# Patient Record
Sex: MALE | NOT HISPANIC OR LATINO
[De-identification: names, ages, dates, MRNs, and addresses within clinical notes are randomized per-mention and may not be internally consistent; named-entity substitution may affect disease eponyms.]

---

## 2017-05-31 ENCOUNTER — OTHER (OUTPATIENT)
Age: 57
End: 2017-05-31

## 2017-05-31 ENCOUNTER — RX RENEWAL (OUTPATIENT)
Age: 57
End: 2017-05-31

## 2017-05-31 PROBLEM — Z00.00 ENCOUNTER FOR PREVENTIVE HEALTH EXAMINATION: Status: ACTIVE | Noted: 2017-05-31

## 2017-06-01 ENCOUNTER — RX RENEWAL (OUTPATIENT)
Age: 57
End: 2017-06-01

## 2017-08-01 ENCOUNTER — APPOINTMENT (OUTPATIENT)
Dept: ENDOCRINOLOGY | Facility: CLINIC | Age: 57
End: 2017-08-01

## 2017-09-26 ENCOUNTER — APPOINTMENT (OUTPATIENT)
Dept: ENDOCRINOLOGY | Facility: CLINIC | Age: 57
End: 2017-09-26
Payer: COMMERCIAL

## 2017-09-26 VITALS
WEIGHT: 194 LBS | HEART RATE: 75 BPM | BODY MASS INDEX: 26.86 KG/M2 | HEIGHT: 71.26 IN | DIASTOLIC BLOOD PRESSURE: 90 MMHG | SYSTOLIC BLOOD PRESSURE: 150 MMHG

## 2017-09-26 DIAGNOSIS — Z87.891 PERSONAL HISTORY OF NICOTINE DEPENDENCE: ICD-10-CM

## 2017-09-26 DIAGNOSIS — Z78.9 OTHER SPECIFIED HEALTH STATUS: ICD-10-CM

## 2017-09-26 DIAGNOSIS — I82.409 ACUTE EMBOLISM AND THROMBOSIS OF UNSPECIFIED DEEP VEINS OF UNSPECIFIED LOWER EXTREMITY: ICD-10-CM

## 2017-09-26 DIAGNOSIS — I51.7 CARDIOMEGALY: ICD-10-CM

## 2017-09-26 DIAGNOSIS — I26.99 OTHER PULMONARY EMBOLISM W/OUT ACUTE COR PULMONALE: ICD-10-CM

## 2017-09-26 PROCEDURE — 99214 OFFICE O/P EST MOD 30 MIN: CPT

## 2017-10-04 ENCOUNTER — RESULT CHARGE (OUTPATIENT)
Age: 57
End: 2017-10-04

## 2017-10-27 ENCOUNTER — APPOINTMENT (OUTPATIENT)
Dept: ENDOCRINOLOGY | Facility: CLINIC | Age: 57
End: 2017-10-27

## 2017-11-09 ENCOUNTER — APPOINTMENT (OUTPATIENT)
Dept: ENDOCRINOLOGY | Facility: CLINIC | Age: 57
End: 2017-11-09

## 2017-11-13 ENCOUNTER — OTHER (OUTPATIENT)
Age: 57
End: 2017-11-13

## 2017-11-20 ENCOUNTER — RX RENEWAL (OUTPATIENT)
Age: 57
End: 2017-11-20

## 2018-02-16 ENCOUNTER — OTHER (OUTPATIENT)
Age: 58
End: 2018-02-16

## 2018-05-03 ENCOUNTER — RX RENEWAL (OUTPATIENT)
Age: 58
End: 2018-05-03

## 2018-05-11 ENCOUNTER — APPOINTMENT (OUTPATIENT)
Dept: ENDOCRINOLOGY | Facility: CLINIC | Age: 58
End: 2018-05-11

## 2018-07-05 ENCOUNTER — RX RENEWAL (OUTPATIENT)
Age: 58
End: 2018-07-05

## 2018-10-22 ENCOUNTER — APPOINTMENT (OUTPATIENT)
Dept: ENDOCRINOLOGY | Facility: CLINIC | Age: 58
End: 2018-10-22

## 2018-10-24 ENCOUNTER — RX RENEWAL (OUTPATIENT)
Age: 58
End: 2018-10-24

## 2018-11-23 ENCOUNTER — RX RENEWAL (OUTPATIENT)
Age: 58
End: 2018-11-23

## 2018-12-07 ENCOUNTER — RX RENEWAL (OUTPATIENT)
Age: 58
End: 2018-12-07

## 2018-12-07 RX ORDER — QUINAPRIL HYDROCHLORIDE 40 MG/1
40 TABLET, FILM COATED ORAL DAILY
Qty: 90 | Refills: 3 | Status: ACTIVE | COMMUNITY
Start: 2017-09-26 | End: 1900-01-01

## 2019-01-14 ENCOUNTER — RX RENEWAL (OUTPATIENT)
Age: 59
End: 2019-01-14

## 2019-01-15 ENCOUNTER — APPOINTMENT (OUTPATIENT)
Dept: ENDOCRINOLOGY | Facility: CLINIC | Age: 59
End: 2019-01-15
Payer: COMMERCIAL

## 2019-01-15 VITALS
SYSTOLIC BLOOD PRESSURE: 134 MMHG | HEART RATE: 71 BPM | DIASTOLIC BLOOD PRESSURE: 78 MMHG | BODY MASS INDEX: 28.56 KG/M2 | HEIGHT: 71 IN | WEIGHT: 204 LBS

## 2019-01-15 DIAGNOSIS — I10 ESSENTIAL (PRIMARY) HYPERTENSION: ICD-10-CM

## 2019-01-15 PROCEDURE — 82962 GLUCOSE BLOOD TEST: CPT

## 2019-01-15 PROCEDURE — 99215 OFFICE O/P EST HI 40 MIN: CPT | Mod: 25

## 2019-01-29 ENCOUNTER — OTHER (OUTPATIENT)
Age: 59
End: 2019-01-29

## 2019-01-29 DIAGNOSIS — E55.9 VITAMIN D DEFICIENCY, UNSPECIFIED: ICD-10-CM

## 2019-01-29 LAB
25(OH)D3 SERPL-MCNC: 9.5 NG/ML
ALBUMIN SERPL ELPH-MCNC: 4.1 G/DL
ALP BLD-CCNC: 61 U/L
ALT SERPL-CCNC: 11 U/L
ANION GAP SERPL CALC-SCNC: 10 MMOL/L
AST SERPL-CCNC: 10 U/L
BILIRUB SERPL-MCNC: 0.4 MG/DL
BUN SERPL-MCNC: 15 MG/DL
C PEPTIDE SERPL-MCNC: 1.1 NG/ML
CALCIUM SERPL-MCNC: 9.5 MG/DL
CHLORIDE SERPL-SCNC: 101 MMOL/L
CHOLEST SERPL-MCNC: 172 MG/DL
CHOLEST/HDLC SERPL: 3.2 RATIO
CO2 SERPL-SCNC: 28 MMOL/L
CREAT SERPL-MCNC: 1.33 MG/DL
CREAT SPEC-SCNC: 141 MG/DL
GLUCOSE BLDC GLUCOMTR-MCNC: 240
GLUCOSE SERPL-MCNC: 206 MG/DL
HBA1C MFR BLD HPLC: 10.7 %
HDLC SERPL-MCNC: 53 MG/DL
LDLC SERPL CALC-MCNC: 88 MG/DL
MICROALBUMIN 24H UR DL<=1MG/L-MCNC: 19.9 MG/DL
MICROALBUMIN/CREAT 24H UR-RTO: 142 MG/G
POTASSIUM SERPL-SCNC: 4.1 MMOL/L
PROT SERPL-MCNC: 7.2 G/DL
SODIUM SERPL-SCNC: 140 MMOL/L
TRIGL SERPL-MCNC: 154 MG/DL
TSH SERPL-ACNC: 1.15 UIU/ML

## 2019-01-29 RX ORDER — LOSARTAN POTASSIUM 50 MG/1
50 TABLET, FILM COATED ORAL
Qty: 90 | Refills: 3 | Status: ACTIVE | COMMUNITY
Start: 2019-01-29 | End: 1900-01-01

## 2019-02-20 ENCOUNTER — RX RENEWAL (OUTPATIENT)
Age: 59
End: 2019-02-20

## 2019-02-25 ENCOUNTER — APPOINTMENT (OUTPATIENT)
Dept: ENDOCRINOLOGY | Facility: CLINIC | Age: 59
End: 2019-02-25
Payer: COMMERCIAL

## 2019-02-25 ENCOUNTER — TRANSCRIPTION ENCOUNTER (OUTPATIENT)
Age: 59
End: 2019-02-25

## 2019-02-25 VITALS
HEART RATE: 65 BPM | SYSTOLIC BLOOD PRESSURE: 132 MMHG | DIASTOLIC BLOOD PRESSURE: 65 MMHG | HEIGHT: 71 IN | WEIGHT: 208 LBS | BODY MASS INDEX: 29.12 KG/M2

## 2019-02-25 PROCEDURE — 82962 GLUCOSE BLOOD TEST: CPT

## 2019-02-25 PROCEDURE — 99215 OFFICE O/P EST HI 40 MIN: CPT | Mod: 25

## 2019-02-26 NOTE — END OF VISIT
[FreeTextEntry3] : All medical record entries made by the Scribe were at my, Dr. Fady Negron, direction and personally dictated by me on 02/25/2019. I have reviewed the chart and agree that the record accurately reflects my personal performance of the history, physical exam, assessment and plan. I have also personally directed, reviewed and agreed with the chart. \par \par   [>50% of Time Spent on Counseling for ____] : Greater than 50% of the encounter time was spent on counseling for [unfilled] [Time Spent: ___ minutes] : I have spent [unfilled] minutes of face to face time with the patient

## 2019-02-26 NOTE — HISTORY OF PRESENT ILLNESS
[FreeTextEntry1] : 7/2016: A1c 10.8%, high TG, s.creat 1.13, alb/creat ratio 305.4, TSH 0.63 \par 1/15/19: A1c 10.7%, BMI 28.45, LDL-88, , s.creat 1.33, Vitamin D 25 OH 9.5, microalb/creat ratio 142, C-peptide 1.1, TSH 1.15\par \par 57 y/o M, /65, BMI 29.01, former pt from [facility], with Hx of T2DM (dx in 1996) with DM related complications, proteinuria and retinopathy. Other significant PMHx includes HTN, HLD, PE (June 2016; hospitalized and d/c with LVH, reduced L ventricular function), DVT and LVH. Pt see's a cardiologist at [place] on a regular basis. Pt does not have a meal schedule, nor does he follow a diabetes diet. Last podiatrist visit: last year. Last Fundoscopic: x2 years ago. Pt presents today for DM f/u. \par \par Today, pt states he is feeling well with no complaints. BS at home as high as 190 and as low as 52. Pt states sometimes he checks his blood sugars in the morning and at other times in the afternoon when he feels symptomatic. Pt notes he still drinks EtOH, but not with any juices. Denies shortness of breath, chest pain, weight changes, nocturia, pins and needle sensation in lower extremities, leg pains, ulcers, GARCIA. \par \par Medicines: Glimepiride 4 mg,  Metformin 1 gm bid, Losartan 50 mg, Basaglar 30 units daily, Januvia, Vitamin D, Xarelto \par

## 2019-02-26 NOTE — ADDENDUM
[FreeTextEntry1] : I, Cirilo Back, acted soley as a scribe for Dr. Fady Negron on this date. 02/25/2019.

## 2019-02-26 NOTE — ASSESSMENT
[Carbohydrate Consistent Diet] : carbohydrate consistent diet [Diabetes Foot Care] : diabetes foot care [Self Monitoring of Blood Glucose] : self monitoring of blood glucose [FreeTextEntry1] : 1. T2DM poorly controlled with multiple DM related complications. Hx of PE and LVH. Appointment with cardiology next month. BP and cholesterol within normal limit. C/o hypoglycemias approximately 20% of the time. Decrease basal from 30 to 25. It will continue to decrease, as long as pt continues with lifestyle and diet improvement. \par \par Appointment to see nutritionist and ophthalmologist. After long conversation, pt agreed to see nutritionist. Continue with ARB. Emphasized importance of documenting post prandial finger stick values with a target glucose level <180. Pt will send me records in 2 weeks. \par \par \par Return in 2 months.  [Long Term Vascular Complications] : long term vascular complications of diabetes

## 2019-02-28 ENCOUNTER — TRANSCRIPTION ENCOUNTER (OUTPATIENT)
Age: 59
End: 2019-02-28

## 2019-02-28 LAB — GLUCOSE BLDC GLUCOMTR-MCNC: 129

## 2019-03-19 ENCOUNTER — APPOINTMENT (OUTPATIENT)
Dept: ENDOCRINOLOGY | Facility: CLINIC | Age: 59
End: 2019-03-19
Payer: COMMERCIAL

## 2019-03-19 VITALS
DIASTOLIC BLOOD PRESSURE: 84 MMHG | SYSTOLIC BLOOD PRESSURE: 152 MMHG | HEART RATE: 60 BPM | HEIGHT: 71 IN | WEIGHT: 208 LBS | BODY MASS INDEX: 29.12 KG/M2

## 2019-03-19 PROCEDURE — 99214 OFFICE O/P EST MOD 30 MIN: CPT | Mod: 25

## 2019-03-19 PROCEDURE — 82962 GLUCOSE BLOOD TEST: CPT

## 2019-03-21 NOTE — ADDENDUM
[FreeTextEntry1] : I, Cirilo Back, acted soley as a scribe for Dr. Fady Negron on this date. 03/19/2019.

## 2019-03-21 NOTE — ASSESSMENT
[Carbohydrate Consistent Diet] : carbohydrate consistent diet [Diabetes Foot Care] : diabetes foot care [Long Term Vascular Complications] : long term vascular complications of diabetes [Self Monitoring of Blood Glucose] : self monitoring of blood glucose [FreeTextEntry1] : 1. T2DM poorly controlled with multiple DM related complications. Hx of PE and LVH. Appointment with cardiology yesterday, BP medications are being adjusted. Pt has turned his care around. Pt is mindful with lifestyle and medical treatment. Continue present drug therapy management. Awaiting for cardiovascular evaluation. Lab tests today. Return in 2 months. \par

## 2019-03-21 NOTE — END OF VISIT
[FreeTextEntry3] : All medical record entries made by the Scribe were at my, Dr. Fady Negron, direction and personally dictated by me on 03/19/2019. I have reviewed the chart and agree that the record accurately reflects my personal performance of the history, physical exam, assessment and plan. I have also personally directed, reviewed and agreed with the chart. \par \par

## 2019-03-21 NOTE — PHYSICAL EXAM
[Alert] : alert [No Acute Distress] : no acute distress [Well Nourished] : well nourished [Well Developed] : well developed [Normal Sclera/Conjunctiva] : normal sclera/conjunctiva [EOMI] : extra ocular movement intact [No Proptosis] : no proptosis [Normal Oropharynx] : the oropharynx was normal [Thyroid Not Enlarged] : the thyroid was not enlarged [No Thyroid Nodules] : there were no palpable thyroid nodules [No Respiratory Distress] : no respiratory distress [No Accessory Muscle Use] : no accessory muscle use [Clear to Auscultation] : lungs were clear to auscultation bilaterally [Normal Rate] : heart rate was normal  [Regular Rhythm] : with a regular rhythm [Normal S1, S2] : normal S1 and S2 [No Edema] : there was no peripheral edema [Pedal Pulses Normal] : the pedal pulses are present [Normal Bowel Sounds] : normal bowel sounds [Soft] : abdomen soft [Not Tender] : non-tender [Post Cervical Nodes] : posterior cervical nodes [Not Distended] : not distended [Axillary Nodes] : axillary nodes [Anterior Cervical Nodes] : anterior cervical nodes [Normal] : normal and non tender [No Spinal Tenderness] : no spinal tenderness [Spine Straight] : spine straight [No Stigmata of Cushings Syndrome] : no stigmata of cushings syndrome [Normal Gait] : normal gait [Normal Strength/Tone] : muscle strength and tone were normal [No Rash] : no rash [Normal Reflexes] : deep tendon reflexes were 2+ and symmetric [No Tremors] : no tremors [Oriented x3] : oriented to person, place, and time [Acanthosis Nigricans] : no acanthosis nigricans

## 2019-03-21 NOTE — HISTORY OF PRESENT ILLNESS
[FreeTextEntry1] : 7/2016: A1c 10.8%, high TG, s.creat 1.13, alb/creat ratio 305.4, TSH 0.63 \par 1/15/19: A1c 10.7%, BMI 28.45, LDL-88, , s.creat 1.33, Vitamin D 25 OH 9.5, microalb/creat ratio 142, C-peptide 1.1, TSH 1.15\par \par 59 y/o M, /84, BMI 29.01, former pt from [facility], with Hx of T2DM (dx in 1996) with DM related complications, proteinuria and retinopathy. Other significant PMHx includes HTN, HLD, PE (June 2016; hospitalized and d/c with LVH, reduced L ventricular function), DVT and LVH. Pt see's a cardiologist at [place] on a regular basis. Pt does not have a meal schedule, nor does he follow a diabetes diet. Last podiatrist visit: last year. Last Fundoscopic: x2 years ago. Pt presents today for DM f/u. \par \par Today, pt states he is feeling well with no complaints. Pt states he saw his cardiologist yesterday, who is adjusting pt's BP meds. Pt reports better sugar levels at home with only 1 encounter of sugar levels > 200. Pt states he changed his portion sizes for his meals. Pt is not drinking as much EtOH as he used to drink. Denies shortness of breath, chest pain, weight changes, nocturia, pins and needle sensation in lower extremities, leg pains, ulcers, GARCIA. \par \par Medicines: Glimepiride 4 mg,  Metformin 1 gm bid, Losartan 50 mg, Basaglar 30 units daily, Januvia, Vitamin D, Xarelto \par

## 2019-03-26 ENCOUNTER — OTHER (OUTPATIENT)
Age: 59
End: 2019-03-26

## 2019-03-28 ENCOUNTER — RX RENEWAL (OUTPATIENT)
Age: 59
End: 2019-03-28

## 2019-03-30 LAB
ALBUMIN SERPL ELPH-MCNC: 4.2 G/DL
ALP BLD-CCNC: 48 U/L
ALT SERPL-CCNC: 15 U/L
ANION GAP SERPL CALC-SCNC: 14 MMOL/L
AST SERPL-CCNC: 13 U/L
BILIRUB SERPL-MCNC: 0.4 MG/DL
BUN SERPL-MCNC: 20 MG/DL
CALCIUM SERPL-MCNC: 9.9 MG/DL
CHLORIDE SERPL-SCNC: 103 MMOL/L
CHOLEST SERPL-MCNC: 189 MG/DL
CHOLEST/HDLC SERPL: 3.4 RATIO
CO2 SERPL-SCNC: 25 MMOL/L
CREAT SERPL-MCNC: 1.31 MG/DL
CREAT SPEC-SCNC: 156 MG/DL
GLUCOSE BLDC GLUCOMTR-MCNC: 107
GLUCOSE SERPL-MCNC: 118 MG/DL
HBA1C MFR BLD HPLC: 9.2 %
HDLC SERPL-MCNC: 55 MG/DL
LDLC SERPL CALC-MCNC: 106 MG/DL
MICROALBUMIN 24H UR DL<=1MG/L-MCNC: 19.3 MG/DL
MICROALBUMIN/CREAT 24H UR-RTO: 124 MG/G
POTASSIUM SERPL-SCNC: 4.3 MMOL/L
PROT SERPL-MCNC: 7.2 G/DL
SODIUM SERPL-SCNC: 142 MMOL/L
TRIGL SERPL-MCNC: 142 MG/DL
TSH SERPL-ACNC: 1.47 UIU/ML

## 2019-04-01 ENCOUNTER — RX RENEWAL (OUTPATIENT)
Age: 59
End: 2019-04-01

## 2019-05-21 ENCOUNTER — APPOINTMENT (OUTPATIENT)
Dept: ENDOCRINOLOGY | Facility: CLINIC | Age: 59
End: 2019-05-21
Payer: COMMERCIAL

## 2019-05-21 VITALS
WEIGHT: 208 LBS | BODY MASS INDEX: 29.12 KG/M2 | HEIGHT: 71 IN | SYSTOLIC BLOOD PRESSURE: 136 MMHG | HEART RATE: 63 BPM | DIASTOLIC BLOOD PRESSURE: 83 MMHG

## 2019-05-21 DIAGNOSIS — E66.3 OVERWEIGHT: ICD-10-CM

## 2019-05-21 LAB — GLUCOSE BLDC GLUCOMTR-MCNC: 133

## 2019-05-21 PROCEDURE — 82962 GLUCOSE BLOOD TEST: CPT

## 2019-05-21 PROCEDURE — 99215 OFFICE O/P EST HI 40 MIN: CPT | Mod: 25

## 2019-05-24 PROBLEM — E66.3 OVERWEIGHT: Status: ACTIVE | Noted: 2019-05-24

## 2019-05-24 NOTE — END OF VISIT
[FreeTextEntry3] : All medical record entries made by the Scribe were at my, Dr. Fady Negron, direction and personally dictated by me on 05/21/2019. I have reviewed the chart and agree that the record accurately reflects my personal performance of the history, physical exam, assessment and plan. I have also personally directed, reviewed and agreed with the chart.\par  [Time Spent: ___ minutes] : I have spent [unfilled] minutes of face to face time with the patient [>50% of Time Spent on Counseling for ____] : Greater than 50% of the encounter time was spent on counseling for [unfilled]

## 2019-05-24 NOTE — PHYSICAL EXAM
[Alert] : alert [No Acute Distress] : no acute distress [Normal Sclera/Conjunctiva] : normal sclera/conjunctiva [No Proptosis] : no proptosis [Normal Oropharynx] : the oropharynx was normal [Thyroid Not Enlarged] : the thyroid was not enlarged [No Thyroid Nodules] : there were no palpable thyroid nodules [No Respiratory Distress] : no respiratory distress [No Accessory Muscle Use] : no accessory muscle use [Clear to Auscultation] : lungs were clear to auscultation bilaterally [Normal Rate] : heart rate was normal  [Pedal Pulses Normal] : the pedal pulses are present [No Edema] : there was no peripheral edema [Normal Bowel Sounds] : normal bowel sounds [Spine Straight] : spine straight [No Stigmata of Cushings Syndrome] : no stigmata of cushings syndrome [Normal Gait] : normal gait [Normal Strength/Tone] : muscle strength and tone were normal [No Rash] : no rash [Normal Reflexes] : deep tendon reflexes were 2+ and symmetric [No Tremors] : no tremors [Oriented x3] : oriented to person, place, and time [Normal Outer Ear/Nose] : the ears and nose were normal in appearance [Right Foot Was Examined] : right foot ~C was examined [Left Foot Was Examined] : left foot ~C was examined [2+] : 2+ in the dorsalis pedis [Acanthosis Nigricans] : no acanthosis nigricans [FreeTextEntry1] : calluses [FreeTextEntry5] : calluses [Swelling] : not swollen

## 2019-05-24 NOTE — ASSESSMENT
[Carbohydrate Consistent Diet] : carbohydrate consistent diet [Diabetes Foot Care] : diabetes foot care [Long Term Vascular Complications] : long term vascular complications of diabetes [Self Monitoring of Blood Glucose] : self monitoring of blood glucose [FreeTextEntry1] : 59 y/o M pt with:\par 1. Hx of T2D with multiple complications:\par Pt is focused and proactive on his DM regimen, overall his glycemic has improved. Recommend pt continue with basal insulin and OAD. \par \par 2. Hx of Hyperlipidemia:\par Pt is at increased risk of developing cardiac events, and sees a a cardiologist at regular basis at [place].\par \par 3. Hx of overweight:\par Pt is physically active and is currently working on his food portions. Set goal to lose 10lbs within next 2-3 months.\par \par Return in 4 months. [Importance of Diet and Exercise] : importance of diet and exercise to improve glycemic control, achieve weight loss and improve cardiovascular health [Action and use of Insulin] : action and use of short and long-acting insulin

## 2019-05-24 NOTE — REVIEW OF SYSTEMS
[Negative] : Endocrine [As Noted in HPI] : as noted in HPI [de-identified] : denies pins and needle sensation in lower extremities

## 2019-05-24 NOTE — HISTORY OF PRESENT ILLNESS
[FreeTextEntry1] : 7/2016: A1c 10.8%, high TG, s.creat 1.13, alb/creat ratio 305.4, TSH 0.63 \par 1/15/19: A1c 10.7%, BMI 28.45, LDL-88, , s.creat 1.33, Vitamin D 25 OH 9.5, microalb/creat ratio 142, C-peptide 1.1, TSH 1.15\par 3/19/19: A1c 9.2%, TSH 1.47, s. creat 1.31, microalb/creat ratio 124 \par \par 57 y/o M, /83, BMI 29.01, former pt from [facility], with Hx of T2DM (dx in 1996) with DM related complications, proteinuria and retinopathy. \par Other significant PMHx includes HTN, HLD, PE (June 2016; hospitalized and d/c with LVH, reduced L ventricular function), DVT and LVH. \par Follows with a cardiologist Dr. Brenda Zuluaga ([phone]) at [place] on a regular basis. \par Does not have a meal schedule, nor does he follow a diabetes diet. \par Last podiatrist visit: last year. \par Last Fundoscopic: x2 years ago. \par \par 3/19/19's visit:\par Today, pt states he is feeling well with no complaints. Pt states he saw his cardiologist yesterday, who is adjusting pt's BP meds. Pt reports better sugar levels at home with only 1 encounter of sugar levels > 200. Pt states he changed his portion sizes for his meals. Pt is not drinking as much EtOH as he used to drink. Denies shortness of breath, chest pain, weight changes, nocturia, pins and needle sensation in lower extremities, leg pains, ulcers, GARCIA. \par \par 5/21/19's visit:\par Today pt presents for DM f/u, feeling better. He notes his main concerns are about his heart. He saw his cardiologist on April 16, and states he will be scheduled for an echo. Pt notes his heart medication was increased to 150 and his blood pressure medication dosage was also changed. He reports blood sugar readings around 183 and 176. \par Pt notes when he drinks he goes to the bathroom 3-4x in the night, and c/o swelling in his knee, which he relates to his DVT.\par He denies pins and needle sensation in lower extremities.\par \par Medicines: Glimepiride 4 mg,  Metformin 1 gm bid, Losartan 50 mg, Basaglar 20 units daily, Januvia 100mg, Vitamin D, Xarelto

## 2019-05-24 NOTE — ADDENDUM
[FreeTextEntry1] : I, Keithgayathri Rowell, acted solely as a scribe for Dr. Fady Negron on this date. 05/21/2019.\par

## 2019-05-27 ENCOUNTER — RX RENEWAL (OUTPATIENT)
Age: 59
End: 2019-05-27

## 2019-06-03 LAB
ALBUMIN SERPL ELPH-MCNC: 4.1 G/DL
ALP BLD-CCNC: 59 U/L
ALT SERPL-CCNC: 15 U/L
ANION GAP SERPL CALC-SCNC: 12 MMOL/L
AST SERPL-CCNC: 10 U/L
BILIRUB SERPL-MCNC: 0.5 MG/DL
BUN SERPL-MCNC: 16 MG/DL
CALCIUM SERPL-MCNC: 9.9 MG/DL
CHLORIDE SERPL-SCNC: 102 MMOL/L
CHOLEST SERPL-MCNC: 168 MG/DL
CHOLEST/HDLC SERPL: 4 RATIO
CO2 SERPL-SCNC: 26 MMOL/L
CREAT SERPL-MCNC: 1.32 MG/DL
CREAT SPEC-SCNC: 159 MG/DL
ESTIMATED AVERAGE GLUCOSE: 212 MG/DL
GLUCOSE SERPL-MCNC: 136 MG/DL
HBA1C MFR BLD HPLC: 9 %
HDLC SERPL-MCNC: 42 MG/DL
LDLC SERPL CALC-MCNC: 63 MG/DL
MICROALBUMIN 24H UR DL<=1MG/L-MCNC: 11.8 MG/DL
MICROALBUMIN/CREAT 24H UR-RTO: 74 MG/G
POTASSIUM SERPL-SCNC: 4.5 MMOL/L
PROT SERPL-MCNC: 7.2 G/DL
SODIUM SERPL-SCNC: 140 MMOL/L
TRIGL SERPL-MCNC: 317 MG/DL
TSH SERPL-ACNC: 1.09 UIU/ML

## 2019-07-01 ENCOUNTER — APPOINTMENT (OUTPATIENT)
Dept: ENDOCRINOLOGY | Facility: CLINIC | Age: 59
End: 2019-07-01

## 2019-07-02 ENCOUNTER — APPOINTMENT (OUTPATIENT)
Dept: ENDOCRINOLOGY | Facility: CLINIC | Age: 59
End: 2019-07-02
Payer: COMMERCIAL

## 2019-07-02 ENCOUNTER — APPOINTMENT (OUTPATIENT)
Dept: ENDOCRINOLOGY | Facility: CLINIC | Age: 59
End: 2019-07-02

## 2019-07-02 VITALS
SYSTOLIC BLOOD PRESSURE: 151 MMHG | DIASTOLIC BLOOD PRESSURE: 85 MMHG | HEIGHT: 71 IN | BODY MASS INDEX: 29.12 KG/M2 | WEIGHT: 208 LBS | HEART RATE: 87 BPM

## 2019-07-02 LAB — GLUCOSE BLDC GLUCOMTR-MCNC: 123

## 2019-07-02 PROCEDURE — 82962 GLUCOSE BLOOD TEST: CPT

## 2019-07-02 PROCEDURE — 99214 OFFICE O/P EST MOD 30 MIN: CPT | Mod: 25

## 2019-09-12 ENCOUNTER — RX RENEWAL (OUTPATIENT)
Age: 59
End: 2019-09-12

## 2019-10-01 ENCOUNTER — APPOINTMENT (OUTPATIENT)
Dept: ENDOCRINOLOGY | Facility: CLINIC | Age: 59
End: 2019-10-01

## 2019-10-30 ENCOUNTER — RX RENEWAL (OUTPATIENT)
Age: 59
End: 2019-10-30

## 2019-11-26 ENCOUNTER — APPOINTMENT (OUTPATIENT)
Dept: ENDOCRINOLOGY | Facility: CLINIC | Age: 59
End: 2019-11-26
Payer: COMMERCIAL

## 2019-11-26 VITALS
DIASTOLIC BLOOD PRESSURE: 74 MMHG | SYSTOLIC BLOOD PRESSURE: 150 MMHG | HEART RATE: 70 BPM | WEIGHT: 204 LBS | BODY MASS INDEX: 28.45 KG/M2

## 2019-11-26 DIAGNOSIS — E16.2 HYPOGLYCEMIA, UNSPECIFIED: ICD-10-CM

## 2019-11-26 PROCEDURE — 99214 OFFICE O/P EST MOD 30 MIN: CPT | Mod: 25

## 2019-11-26 PROCEDURE — 82962 GLUCOSE BLOOD TEST: CPT

## 2019-11-26 RX ORDER — BLOOD-GLUCOSE METER
W/DEVICE KIT MISCELLANEOUS
Qty: 1 | Refills: 0 | Status: ACTIVE | COMMUNITY
Start: 2019-11-26 | End: 1900-01-01

## 2019-11-27 PROBLEM — E16.2 HYPOGLYCEMIA: Status: ACTIVE | Noted: 2019-11-27

## 2019-11-27 NOTE — PHYSICAL EXAM
[Alert] : alert [Normal Sclera/Conjunctiva] : normal sclera/conjunctiva [Thyroid Not Enlarged] : the thyroid was not enlarged [No Proptosis] : no proptosis [Normal Outer Ear/Nose] : the ears and nose were normal in appearance [No Respiratory Distress] : no respiratory distress [No Thyroid Nodules] : there were no palpable thyroid nodules [No Accessory Muscle Use] : no accessory muscle use [Clear to Auscultation] : lungs were clear to auscultation bilaterally [Pedal Pulses Normal] : the pedal pulses are present [Normal Rate] : heart rate was normal  [Spine Straight] : spine straight [Normal Bowel Sounds] : normal bowel sounds [No Edema] : there was no peripheral edema [Normal Gait] : normal gait [No Stigmata of Cushings Syndrome] : no stigmata of cushings syndrome [No Rash] : no rash [Normal Strength/Tone] : muscle strength and tone were normal [Left Foot Was Examined] : left foot ~C was examined [Right Foot Was Examined] : right foot ~C was examined [Normal Reflexes] : deep tendon reflexes were 2+ and symmetric [No Tremors] : no tremors [Oriented x3] : oriented to person, place, and time [Acanthosis Nigricans] : no acanthosis nigricans [Swelling] : not swollen [FreeTextEntry1] : calluses [FreeTextEntry5] : calluses

## 2019-11-27 NOTE — END OF VISIT
[FreeTextEntry3] : All medical record entries made by the Scribe were at my, Dr. Fady Negron, direction and personally dictated by me on 11/26/2019. I have reviewed the chart and agree that the record accurately reflects my personal performance of the history, physical exam, assessment and plan. I have also personally directed, reviewed and agreed with the chart. [>50% of Time Spent on Counseling for ____] : Greater than 50% of the encounter time was spent on counseling for [unfilled] [Time Spent: ___ minutes] : I have spent [unfilled] minutes of face to face time with the patient

## 2019-11-27 NOTE — HISTORY OF PRESENT ILLNESS
[FreeTextEntry1] : 7/2016: A1c 10.8%, high TG, s.creat 1.13, alb/creat ratio 305.4, TSH 0.63 \par 6/17/19: A1c 8.5%, total cholesterol 214, , LDL-c 114, \par \par 60 y/o M, /74, BMI 28.45, former pt from [facility], with Hx of T2DM (dx in 1996) with DM related complications, proteinuria and retinopathy. \par Other significant PMHx includes HTN, HLD, PE (June 2016; hospitalized and d/c with LVH, reduced L ventricular function), DVT and LVH. Follows with a cardiologist Dr. Brenda Zuluaga ([phone]) at [place] on a regular basis. \par Last cardiologist visit: 10/2019\par Last PCP visit: currently looking for a PCP\par \par 3/19/19's visit:\par Today, pt states he is feeling well with no complaints. Pt states he saw his cardiologist yesterday, who is adjusting pt's BP meds. Pt reports better sugar levels at home with only 1 encounter of sugar levels > 200. Pt states he changed his portion sizes for his meals. Pt is not drinking as much EtOH as he used to drink. Denies shortness of breath, chest pain, weight changes, nocturia, pins and needle sensation in lower extremities, leg pains, ulcers, GARCIA. \par \par 5/21/19's visit:\par Today pt presents for DM f/u, feeling better. He notes his main concerns are about his heart. He saw his cardiologist on April 16, and states he will be scheduled for an echo. Pt notes his heart medication was increased to 150 and his blood pressure medication dosage was also changed. He reports blood sugar readings around 183 and 176. \par Pt notes when he drinks he goes to the bathroom 3-4x in the night, and c/o swelling in his knee, which he relates to his DVT.\par He denies pins and needle sensation in lower extremities.\par \par 7/2/19's visit:\par Today pt presents for DM f/u, feeling well with no major physical complaints besides developing a styex6 days.\par Pt states he was taken off metoprolol, and is now using carvedilol 25mg BID and losartan 100mg QD.\par Denies pins and needle sensation in lower extremities, SOB, CP, and nocturia.\par \par 11/26/19\par Today pt presents for DM f/u; pt had an episodes of severe hypoglycemia with BS of 32 at 6:30 AM today. He reports he did not eat dinner last night. Pt states on his way to work today he passed out for ~1 hour and 30 minutes 2/2 low BS and he was sent to the hospital at 7AM. Pt states he was given orange juice and apple juice along with an IV at the hospital. He notes he ate oatmeal around 11 AM, and his POCT is currently 225. \par Pt has not monitored BS since 10/2019.\par Pt has weight loss ~ 4lbs since last visit (02/07/19)\par \par Current Medication: Atorvastatin 20mg QD (started 07/02/19) , Glimepiride 4 mg QD,  Metformin 1 gm BID, Losartan 100 mg QD, Basaglar 30 u QD, Januvia 100 mg, Vitamin D, Carvedilol 25 mg BID, Xarelto

## 2019-11-27 NOTE — REVIEW OF SYSTEMS
[Recent Weight Loss (___ Lbs)] : recent [unfilled] ~Ulb weight loss [Negative] : Integumentary [As Noted in HPI] : as noted in HPI [Blurry Vision] : no blurred vision [Chest Pain] : no chest pain [Shortness Of Breath] : no shortness of breath [Nocturia] : no nocturia [de-identified] : denies pins and needle sensation in lower extremities  [de-identified] : episodes of hypoglycemia

## 2019-11-27 NOTE — ADDENDUM
[FreeTextEntry1] : I, Zuleyma Sanchez, acted solely as a scribe for Dr. Fady Negron on this date. 11/26/2019.\par I, Gretchen Rowell, acted solely as a scribe for Dr. Fady Negron on this date. 11/26/2019.

## 2019-11-27 NOTE — ASSESSMENT
[Carbohydrate Consistent Diet] : carbohydrate consistent diet [Long Term Vascular Complications] : long term vascular complications of diabetes [Hypoglycemia Management] : hypoglycemia management [Importance of Diet and Exercise] : importance of diet and exercise to improve glycemic control, achieve weight loss and improve cardiovascular health [Self Monitoring of Blood Glucose] : self monitoring of blood glucose [FreeTextEntry1] : 58 y/o M pt with:\par \par 1. Hx of longstanding T2DM with multiple complications including CAD:\par Pt is working on lifestyle and diet management, and he is working on reaching pre and post prandial glucose targets. Pt experienced severe hypoglycemia episode today after prolonged fasting (>12 hrs) and lost his consciousness. Discussed hypoglycemia prevention and treatments with pt. \par Recommend pt decrease Basaglar  to 18 u, decrease Glimepiride from 4 mg to 2 mg, continue with Januvia and continue with Metformin. \par Referred to neurologist for hypoglycemia evaluation. \par Labs ordered today\par \par Return in 2 months.

## 2019-12-02 ENCOUNTER — OTHER (OUTPATIENT)
Age: 59
End: 2019-12-02

## 2019-12-02 LAB
ALBUMIN SERPL ELPH-MCNC: 4.3 G/DL
ALP BLD-CCNC: 45 U/L
ALT SERPL-CCNC: 19 U/L
ANION GAP SERPL CALC-SCNC: 14 MMOL/L
AST SERPL-CCNC: 22 U/L
BILIRUB SERPL-MCNC: 0.7 MG/DL
BUN SERPL-MCNC: 17 MG/DL
CALCIUM SERPL-MCNC: 9.5 MG/DL
CHLORIDE SERPL-SCNC: 101 MMOL/L
CHOLEST SERPL-MCNC: 186 MG/DL
CHOLEST/HDLC SERPL: 3 RATIO
CO2 SERPL-SCNC: 23 MMOL/L
CREAT SERPL-MCNC: 1.69 MG/DL
CREAT SPEC-SCNC: 149 MG/DL
ESTIMATED AVERAGE GLUCOSE: 160 MG/DL
GLUCOSE BLDC GLUCOMTR-MCNC: 225
GLUCOSE SERPL-MCNC: 175 MG/DL
HBA1C MFR BLD HPLC: 7.2 %
HDLC SERPL-MCNC: 62 MG/DL
LDLC SERPL CALC-MCNC: 97 MG/DL
MICROALBUMIN 24H UR DL<=1MG/L-MCNC: 6.8 MG/DL
MICROALBUMIN/CREAT 24H UR-RTO: 46 MG/G
POTASSIUM SERPL-SCNC: 4.4 MMOL/L
PROT SERPL-MCNC: 7.2 G/DL
SODIUM SERPL-SCNC: 138 MMOL/L
TRIGL SERPL-MCNC: 134 MG/DL

## 2019-12-04 ENCOUNTER — OTHER (OUTPATIENT)
Age: 59
End: 2019-12-04

## 2019-12-16 ENCOUNTER — RX RENEWAL (OUTPATIENT)
Age: 59
End: 2019-12-16

## 2019-12-16 ENCOUNTER — TRANSCRIPTION ENCOUNTER (OUTPATIENT)
Age: 59
End: 2019-12-16

## 2019-12-17 ENCOUNTER — APPOINTMENT (OUTPATIENT)
Dept: ENDOCRINOLOGY | Facility: CLINIC | Age: 59
End: 2019-12-17
Payer: COMMERCIAL

## 2019-12-17 VITALS
SYSTOLIC BLOOD PRESSURE: 134 MMHG | WEIGHT: 209 LBS | HEART RATE: 75 BPM | BODY MASS INDEX: 29.15 KG/M2 | DIASTOLIC BLOOD PRESSURE: 79 MMHG

## 2019-12-17 PROCEDURE — 99214 OFFICE O/P EST MOD 30 MIN: CPT | Mod: 25

## 2019-12-17 PROCEDURE — 82962 GLUCOSE BLOOD TEST: CPT

## 2019-12-20 NOTE — ASSESSMENT
[Hypoglycemia Management] : hypoglycemia management [Carbohydrate Consistent Diet] : carbohydrate consistent diet [Importance of Diet and Exercise] : importance of diet and exercise to improve glycemic control, achieve weight loss and improve cardiovascular health [Long Term Vascular Complications] : long term vascular complications of diabetes [Self Monitoring of Blood Glucose] : self monitoring of blood glucose [FreeTextEntry1] : 58 y/o M pt with:\par \par 1. Hx of poorly controlled T2DM with multiple complications including CAD:\par Pt's management of DM has improved over the past couple of months. Pt is focused and has been making effort towards treatment goals. However, his diet is uncontrolled and continues to be high in calories. Pt recently presented with severe hypoglycemic episodes after prolonged fasting and was referred to neurologist. To improve his DM outcome, pt will have focus on his lifestyle and diet management. Pt is seeing cardiologist in 2 weeks. Referred pt to RD to improve DM outcome. \par \par Return in 5/2020\par \par \par

## 2019-12-20 NOTE — REVIEW OF SYSTEMS
[Negative] : Psychiatric [Recent Weight Gain (___ Lbs)] : recent [unfilled] ~Ulb weight gain [As Noted in HPI] : as noted in HPI [Chest Pain] : no chest pain [Shortness Of Breath] : no shortness of breath [Nocturia] : no nocturia [de-identified] : denies pins and needle sensation in lower extremities

## 2019-12-20 NOTE — END OF VISIT
[FreeTextEntry3] : All medical record entries made by the Scribe were at my, Dr. Fady Negron, direction and personally dictated by me on 12/17/2019. I have reviewed the chart and agree that the record accurately reflects my personal performance of the history, physical exam, assessment and plan. I have also personally directed, reviewed and agreed with the chart.  [Time Spent: ___ minutes] : I have spent [unfilled] minutes of face to face time with the patient [>50% of Time Spent on Counseling for ____] : Greater than 50% of the encounter time was spent on counseling for [unfilled]

## 2019-12-20 NOTE — HISTORY OF PRESENT ILLNESS
[FreeTextEntry1] : 7/2016: A1c 10.8%, high TG, s.creat 1.13, alb/creat ratio 305.4, TSH 0.63 \par 6/17/19: A1c 8.5%, total cholesterol 214, , LDL-c 114, \par 11/26/19: A1c 7.2%, Mean Plasma Glucose 160, s.creat 1.69, Microalb/Creat Ratio 46, LDL-c 97\par \par 58 y/o M, /79, BMI 29.15, former pt from [facility], with Hx of T2DM (dx in 1996) with DM related complications, proteinuria and retinopathy. \par Other significant PMHx includes HTN, HLD, PE (June 2016; hospitalized and d/c with LVH, reduced L ventricular function), DVT and LVH. Follows with a cardiologist Dr. Brenda Zuluaga ([phone]) at [place] on a regular basis. \par Last ophthalmologist visit: several years ago\par Last cardiologist visit: scheduled for end of 12/2019\par Last PCP visit: currently looking for a PCP\par Pt has not seen RD.\par \par 3/19/19's visit:\par Today, pt states he is feeling well with no complaints. Pt states he saw his cardiologist yesterday, who is adjusting pt's BP meds. Pt reports better sugar levels at home with only 1 encounter of sugar levels > 200. Pt states he changed his portion sizes for his meals. Pt is not drinking as much EtOH as he used to drink. Denies shortness of breath, chest pain, weight changes, nocturia, pins and needle sensation in lower extremities, leg pains, ulcers, GARCIA. \par \par 5/21/19's visit:\par Today pt presents for DM f/u, feeling better. He notes his main concerns are about his heart. He saw his cardiologist on April 16, and states he will be scheduled for an echo. Pt notes his heart medication was increased to 150 and his blood pressure medication dosage was also changed. He reports blood sugar readings around 183 and 176. \par Pt notes when he drinks he goes to the bathroom 3-4x in the night, and c/o swelling in his knee, which he relates to his DVT.\par He denies pins and needle sensation in lower extremities.\par \par 7/2/19's visit:\par Today pt presents for DM f/u, feeling well with no major physical complaints besides developing a styex6 days.\par Pt states he was taken off metoprolol, and is now using carvedilol 25mg BID and losartan 100mg QD.\par Denies pins and needle sensation in lower extremities, SOB, CP, and nocturia.\par \par 11/26/19\par Today pt presents for DM f/u; pt had an episodes of severe hypoglycemia with BS of 32 at 6:30 AM today. He reports he did not eat dinner last night. Pt states on his way to work today he passed out for ~1 hour and 30 minutes 2/2 low BS and he was sent to the hospital at 7AM. Pt states he was given orange juice and apple juice along with an IV at the hospital. He notes he ate oatmeal around 11 AM, and his POCT is currently 225. \par Pt has not monitored BS since 10/2019.\par Pt has weight loss ~ 4lbs since last visit (02/07/19)\par \par 12/17/2019 \par Today pt with POCT 130 presents for DM f/u, feeling good.  Pt felt like he had "low BS" this morning and drank orange juice and he had a big breakfast.\par Pt notes that he monitors BS 2-3 times a week and his FBS ranges from 90 to 110, and occasional 160. Pt reports that he has not seen neurologist for hypoglycemic evaluation as recommended in his last visit. He also states that he has not visited RD. Pt has gained 5lbs since his last visit. \par Denies pins and needles sensation in lower extremities. \par \par Current Medication: Atorvastatin 20mg QD (started 07/02/19) , Glimepiride 2mg QD,  Metformin 1 gm BID, Losartan 100 mg QD, Basaglar 20u QD, Januvia 100 mg, Vitamin D, Carvedilol 25 mg BID, Xarelto

## 2019-12-20 NOTE — PHYSICAL EXAM
[Alert] : alert [Normal Sclera/Conjunctiva] : normal sclera/conjunctiva [Normal Outer Ear/Nose] : the ears and nose were normal in appearance [No Proptosis] : no proptosis [No Thyroid Nodules] : there were no palpable thyroid nodules [Thyroid Not Enlarged] : the thyroid was not enlarged [No Respiratory Distress] : no respiratory distress [No Accessory Muscle Use] : no accessory muscle use [Clear to Auscultation] : lungs were clear to auscultation bilaterally [Normal Rate] : heart rate was normal  [Pedal Pulses Normal] : the pedal pulses are present [No Edema] : there was no peripheral edema [Normal Bowel Sounds] : normal bowel sounds [Spine Straight] : spine straight [Normal Gait] : normal gait [No Stigmata of Cushings Syndrome] : no stigmata of cushings syndrome [No Rash] : no rash [Normal Strength/Tone] : muscle strength and tone were normal [Left Foot Was Examined] : left foot ~C was examined [Right Foot Was Examined] : right foot ~C was examined [Normal Reflexes] : deep tendon reflexes were 2+ and symmetric [No Tremors] : no tremors [Oriented x3] : oriented to person, place, and time [Acanthosis Nigricans] : no acanthosis nigricans [Swelling] : not swollen [FreeTextEntry5] : calluses [FreeTextEntry1] : calluses

## 2019-12-20 NOTE — ADDENDUM
[FreeTextEntry1] : I, Zuleyma Sanchez, acted solely as a scribe for Dr. Fady Negron on this date. 12/17/2019. \par I, Gretchen Rowell, acted solely as a scribe for Dr. Fady Negron on this date. 12/17/2019.

## 2019-12-21 LAB — GLUCOSE BLDC GLUCOMTR-MCNC: 130

## 2020-01-25 ENCOUNTER — RX RENEWAL (OUTPATIENT)
Age: 60
End: 2020-01-25

## 2020-04-14 ENCOUNTER — APPOINTMENT (OUTPATIENT)
Dept: ENDOCRINOLOGY | Facility: CLINIC | Age: 60
End: 2020-04-14

## 2020-07-24 ENCOUNTER — APPOINTMENT (OUTPATIENT)
Dept: ENDOCRINOLOGY | Facility: CLINIC | Age: 60
End: 2020-07-24
Payer: COMMERCIAL

## 2020-07-24 PROCEDURE — 99214 OFFICE O/P EST MOD 30 MIN: CPT | Mod: 95

## 2020-07-26 NOTE — HISTORY OF PRESENT ILLNESS
[Home] : at home, [unfilled] , at the time of the visit. [Verbal consent obtained from patient] : the patient, [unfilled] [Other Location: e.g. Home (Enter Location, City,State)___] : at [unfilled] [FreeTextEntry1] : 60 y/o M, /79, BMI 29.15, former pt from [facility], with Hx of T2DM (dx in 1996) with DM related complications, proteinuria and retinopathy. \par Other significant PMHx includes HTN, HLD, PE (June 2016; hospitalized and d/c with LVH, reduced L ventricular function), DVT and LVH. Follows with a cardiologist Dr. Brenda Zuluaga ([phone]) at [place] on a regular basis. \par Last ophthalmologist visit: several years ago\par Last cardiologist visit: scheduled for end of 12/2019\par Last PCP visit: currently looking for a PCP\par \par \par 11/26/19\par Today pt presents for DM f/u; pt had an episodes of severe hypoglycemia with BS of 32 at 6:30 AM today. He reports he did not eat dinner last night. Pt states on his way to work today he passed out for ~1 hour and 30 minutes 2/2 low BS and he was sent to the hospital at 7AM. Pt states he was given orange juice and apple juice along with an IV at the hospital. He notes he ate oatmeal around 11 AM, and his POCT is currently 225. \par Pt has not monitored BS since 10/2019.\par Pt has weight loss ~ 4lbs since last visit (02/07/19)\par \par 12/17/2019 \par Today pt with POCT 130 presents for DM f/u, feeling good.  Pt felt like he had "low BS" this morning and drank orange juice and he had a big breakfast.\par Pt notes that he monitors BS 2-3 times a week and his FBS ranges from 90 to 110, and occasional 160. Pt reports that he has not seen neurologist for hypoglycemic evaluation as recommended in his last visit. He also states that he has not visited RD. Pt has gained 5lbs since his last visit. \par Denies pins and needles sensation in lower extremities. \par \par \par 7/24/20 Telehealth\par He did not have question prior to restriction to today visit\par T2d, proteinuria, CKD, hyperlipidemia,LVH.\par COVID-19 stay home, he continues focus with his treatment\par Funduscopic exam: not in a while\par Clinically doing well, no hypo's,no osmotic diuresis .\par FBS: < 140's\par 7/2016: A1c 10.8%, high TG, s.creat 1.13, alb/creat ratio 305.4, TSH 0.63 \par 11/26/19: A1c 7.2%, Mean Plasma Glucose 160, s.creat 1.69, Microalb/Creat Ratio 46, LDL-c 97\par \par Current Medication: Atorvastatin 20mg QD (started 07/02/19) , Glimepiride 2mg QD,  Metformin 1 gm BID, Losartan 100 mg QD, Basaglar 20u QD, Januvia 100 mg, Vitamin D, Carvedilol 25 mg BID, Xarelto

## 2020-08-31 ENCOUNTER — RX RENEWAL (OUTPATIENT)
Age: 60
End: 2020-08-31

## 2020-09-28 ENCOUNTER — TRANSCRIPTION ENCOUNTER (OUTPATIENT)
Age: 60
End: 2020-09-28

## 2020-09-30 ENCOUNTER — TRANSCRIPTION ENCOUNTER (OUTPATIENT)
Age: 60
End: 2020-09-30

## 2020-10-07 ENCOUNTER — TRANSCRIPTION ENCOUNTER (OUTPATIENT)
Age: 60
End: 2020-10-07

## 2020-11-16 ENCOUNTER — TRANSCRIPTION ENCOUNTER (OUTPATIENT)
Age: 60
End: 2020-11-16

## 2020-11-17 ENCOUNTER — NON-APPOINTMENT (OUTPATIENT)
Age: 60
End: 2020-11-17

## 2020-12-15 ENCOUNTER — RX RENEWAL (OUTPATIENT)
Age: 60
End: 2020-12-15

## 2021-01-04 ENCOUNTER — TRANSCRIPTION ENCOUNTER (OUTPATIENT)
Age: 61
End: 2021-01-04

## 2021-01-05 ENCOUNTER — APPOINTMENT (OUTPATIENT)
Dept: ENDOCRINOLOGY | Facility: CLINIC | Age: 61
End: 2021-01-05
Payer: COMMERCIAL

## 2021-01-05 VITALS
WEIGHT: 210 LBS | BODY MASS INDEX: 29.4 KG/M2 | DIASTOLIC BLOOD PRESSURE: 86 MMHG | SYSTOLIC BLOOD PRESSURE: 142 MMHG | HEART RATE: 65 BPM | HEIGHT: 71 IN

## 2021-01-05 DIAGNOSIS — E78.5 HYPERLIPIDEMIA, UNSPECIFIED: ICD-10-CM

## 2021-01-05 LAB — GLUCOSE BLDC GLUCOMTR-MCNC: 245

## 2021-01-05 PROCEDURE — 99215 OFFICE O/P EST HI 40 MIN: CPT | Mod: 25

## 2021-01-05 PROCEDURE — 99072 ADDL SUPL MATRL&STAF TM PHE: CPT

## 2021-01-05 PROCEDURE — 82962 GLUCOSE BLOOD TEST: CPT

## 2021-01-06 PROBLEM — E78.5 HYPERLIPIDEMIA LDL GOAL <100: Status: ACTIVE | Noted: 2020-07-26

## 2021-01-06 NOTE — PHYSICAL EXAM
[Alert] : alert [Normal Sclera/Conjunctiva] : normal sclera/conjunctiva [No Proptosis] : no proptosis [Normal Outer Ear/Nose] : the ears and nose were normal in appearance [No Neck Mass] : no neck mass was observed [Thyroid Not Enlarged] : the thyroid was not enlarged [No Thyroid Nodules] : no palpable thyroid nodules [No Respiratory Distress] : no respiratory distress [Clear to Auscultation] : lungs were clear to auscultation bilaterally [Normal Rate] : heart rate was normal [No Edema] : no peripheral edema [Pedal Pulses Normal] : the pedal pulses are present [Normal Bowel Sounds] : normal bowel sounds [Spine Straight] : spine straight [No Stigmata of Cushings Syndrome] : no stigmata of Cushings Syndrome [Normal Gait] : normal gait [Right Foot Was Examined] : right foot ~C was examined [Left Foot Was Examined] : left foot ~C was examined [No Tremors] : no tremors [Oriented x3] : oriented to person, place, and time [2+] : 2+ in the dorsalis pedis [Acanthosis Nigricans] : no acanthosis nigricans [de-identified] : B [FreeTextEntry1] : calluses [FreeTextEntry2] : dry shiny thick nails  [FreeTextEntry5] : calluses  [FreeTextEntry6] : dry shiny thick nails

## 2021-01-06 NOTE — REVIEW OF SYSTEMS
[Recent Weight Gain (___ Lbs)] : recent weight gain: [unfilled] lbs [Blurred Vision] : blurred vision [As Noted in HPI] : as noted in HPI [Negative] : Psychiatric [de-identified] : denies pins and needle sensation in lower extremities  [de-identified] : d

## 2021-01-06 NOTE — ADDENDUM
[FreeTextEntry1] : I, Gretchen Rowell, acted solely as a scribe for Dr. Fady Negron on this date. 01/05/2021.

## 2021-01-06 NOTE — HISTORY OF PRESENT ILLNESS
[FreeTextEntry1] : 61 y/o M, /86, BMI 29.29, former pt from [facility], with Hx of T2DM (dx in 1996) with DM related complications, proteinuria and retinopathy. \par Other significant PMHx includes HTN, HLD, PE (June 2016; hospitalized and d/c with LVH, reduced L ventricular function), DVT and LVH. Follows with a cardiologist Dr. Brenda Zuluaga ([phone]) at [place] on a regular basis. \par Last ophthalmologist visit: several years ago\par Last cardiologist visit: summer 2020\par \par 12/17/2019 \par Today pt with POCT 130 presents for DM f/u, feeling good.  Pt felt like he had "low BS" this morning and drank orange juice and he had a big breakfast.\par Pt notes that he monitors BS 2-3 times a week and his FBS ranges from 90 to 110, and occasional 160. Pt reports that he has not seen neurologist for hypoglycemic evaluation as recommended in his last visit. He also states that he has not visited RD. Pt has gained 5lbs since his last visit. \par Denies pins and needles sensation in lower extremities. \par \par 7/24/20 Telehealth\par He did not have question prior to restriction to today visit\par T2d, proteinuria, CKD, hyperlipidemia,LVH.\par COVID-19 stay home, he continues focus with his treatment\par Funduscopic exam: not in a while\par Clinically doing well, no hypo's,no osmotic diuresis .\par FBS: < 140's\par \par 1/5/21\par Today pt presents for DM f/u with POCT 245, feeling well with c/o blurry vision, and weight gain. Pt states he will make an appointment to see Ophthalmologist soon. \par Pt notes he has not been checking his BS readings\par Pt states he is on Lantus 20u, Metformin 1g BID, Glimepiride 4mg QD, Onglyza 5mg QD, Carvedilol 25mg BID, Aldactone/Spironolactone 25mg QD, Xarelto 20mg, and Olmesartan 40mg. \par Denies pins and needle sensation in lower extremities, and shoulder/hand pain.\par \par Current Medication: Lantus 20u, Metformin 1g BID, Glimepiride 4mg QD, Onglyza 5mg QD, Carvedilol 25mg BID, Aldactone/Spironolactone 25mg QD, Xarelto 20mg, and Olmesartan 40mg\par \par Labs:\par - 11/26/19: A1c 7.2%, Mean Plasma Glucose 160, s.creat 1.69, Microalb/Creat Ratio 46, LDL-c 97\par - 7/2016: A1c 10.8%, high TG, s.creat 1.13, alb/creat ratio 305.4, TSH 0.63

## 2021-01-06 NOTE — ASSESSMENT
[Importance of Diet and Exercise] : importance of diet and exercise to improve glycemic control, achieve weight loss and improve cardiovascular health [Hypoglycemia Management] : hypoglycemia management [Action and use of Insulin] : action and use of short and long-acting insulin [Self Monitoring of Blood Glucose] : self monitoring of blood glucose [FreeTextEntry1] : 59 y/o M pt with:\par \par 1. T2DM (dx in 1996):\par Pt's DM is poorly controlled with hx of proteinuria, retinopathy, and L Ventricular Hypertrophy. Pt sees Cardiologist at [place]. Other PMHx include DVT and PE. \par Today POCT A1c was 9.3% and POCT sugar was 245. Pt admitted that he was not focused with his DM management during the pandemic. Diabetes treatment goals discussed. \par Pt is currently on Lantus 20u QD, Metformin 1g BID, Glimepiride 4mg QD, and Onglyza 5mg QD. \par Recommend pt increase Lantus to 25u QD, initiate Humalog 8u ac meals, initiate Jardiance 10mg QD, continue Metformin 1g BID, discontinue Onglyza, and discontinue Glimepiride. \par In addition, recommend pt check BS readings before/after meals. \par Will order labs today. \par \par 2. Mixed hyperlipidemia, predominantly hypertriglyceredemia\par He is at increase risk for ASCVD,\par Rx statins\par Return in 3 months\par \par \par \par \par \par

## 2021-01-06 NOTE — END OF VISIT
[Time Spent: ___ minutes] : I have spent [unfilled] minutes of time on the encounter. [>50% of the face to face encounter time was spent on counseling and/or coordination of care for ___] : Greater than 50% of the face to face encounter time was spent on counseling and/or coordination of care for [unfilled] [FreeTextEntry3] : All medical record entries made by the Scribe were at my, Dr. Fady Negron, direction and personally dictated by me on 01/05/2021. I have reviewed the chart and agree that the record accurately reflects my personal performance of the history, physical exam, assessment and plan. I have also personally directed, reviewed and agreed with the chart.

## 2021-01-07 RX ORDER — INSULIN LISPRO 100 [IU]/ML
100 INJECTION, SOLUTION SUBCUTANEOUS 3 TIMES DAILY
Qty: 3 | Refills: 11 | Status: COMPLETED | COMMUNITY
Start: 2021-01-05 | End: 2021-01-07

## 2021-01-26 ENCOUNTER — RX CHANGE (OUTPATIENT)
Age: 61
End: 2021-01-26

## 2021-02-16 ENCOUNTER — RX RENEWAL (OUTPATIENT)
Age: 61
End: 2021-02-16

## 2021-02-17 LAB
ALBUMIN SERPL ELPH-MCNC: 4.4 G/DL
ALP BLD-CCNC: 62 U/L
ALT SERPL-CCNC: 16 U/L
ANION GAP SERPL CALC-SCNC: 14 MMOL/L
AST SERPL-CCNC: 12 U/L
BILIRUB SERPL-MCNC: 0.2 MG/DL
BUN SERPL-MCNC: 22 MG/DL
CALCIUM SERPL-MCNC: 10.2 MG/DL
CHLORIDE SERPL-SCNC: 103 MMOL/L
CHOLEST SERPL-MCNC: 182 MG/DL
CO2 SERPL-SCNC: 22 MMOL/L
CREAT SERPL-MCNC: 1.8 MG/DL
CREAT SPEC-SCNC: 140 MG/DL
ESTIMATED AVERAGE GLUCOSE: 229 MG/DL
FOLATE SERPL-MCNC: 8 NG/ML
GLUCOSE SERPL-MCNC: 239 MG/DL
HBA1C MFR BLD HPLC: 9.3
HBA1C MFR BLD HPLC: 9.6 %
HDLC SERPL-MCNC: 49 MG/DL
LDLC SERPL CALC-MCNC: 58 MG/DL
MICROALBUMIN 24H UR DL<=1MG/L-MCNC: 10 MG/DL
MICROALBUMIN/CREAT 24H UR-RTO: 72 MG/G
NONHDLC SERPL-MCNC: 133 MG/DL
POTASSIUM SERPL-SCNC: 5.3 MMOL/L
PROT SERPL-MCNC: 7.5 G/DL
SODIUM SERPL-SCNC: 138 MMOL/L
TRIGL SERPL-MCNC: 373 MG/DL
TSH SERPL-ACNC: 1.45 UIU/ML
VIT B12 SERPL-MCNC: 298 PG/ML

## 2021-02-23 ENCOUNTER — TRANSCRIPTION ENCOUNTER (OUTPATIENT)
Age: 61
End: 2021-02-23

## 2021-03-15 ENCOUNTER — TRANSCRIPTION ENCOUNTER (OUTPATIENT)
Age: 61
End: 2021-03-15

## 2021-03-31 ENCOUNTER — TRANSCRIPTION ENCOUNTER (OUTPATIENT)
Age: 61
End: 2021-03-31

## 2021-04-06 ENCOUNTER — APPOINTMENT (OUTPATIENT)
Dept: ENDOCRINOLOGY | Facility: CLINIC | Age: 61
End: 2021-04-06
Payer: COMMERCIAL

## 2021-04-06 VITALS
SYSTOLIC BLOOD PRESSURE: 108 MMHG | HEART RATE: 62 BPM | WEIGHT: 211 LBS | DIASTOLIC BLOOD PRESSURE: 63 MMHG | BODY MASS INDEX: 29.43 KG/M2

## 2021-04-06 PROCEDURE — 82962 GLUCOSE BLOOD TEST: CPT

## 2021-04-06 PROCEDURE — 99072 ADDL SUPL MATRL&STAF TM PHE: CPT

## 2021-04-06 PROCEDURE — 99214 OFFICE O/P EST MOD 30 MIN: CPT | Mod: 25

## 2021-04-09 NOTE — ASSESSMENT
[Importance of Diet and Exercise] : importance of diet and exercise to improve glycemic control, achieve weight loss and improve cardiovascular health [Hypoglycemia Management] : hypoglycemia management [Action and use of Insulin] : action and use of short and long-acting insulin [Self Monitoring of Blood Glucose] : self monitoring of blood glucose [FreeTextEntry1] : 59 y/o M pt with:\par \par 1. T2DM (dx in 1996): \par Pt's DM is poorly controlled with hx of proteinuria, retinopathy, and L Ventricular Hypertrophy.\par His A1c as been in average of 9.2%. Patient bought glucose records in today, in which most of his post prandials were  higher than 220. \par Dx treatment goals explained to patient again. He understands that he is at increased risk to develop dm complications and cardiac events. Initially he was reluctant to modify his regime, but now he is try GLP-1. Will adjust insulin dose; decrease 6 units with each meal. Continue metformin and basal insulin\par F/U Optho/podiatrist\par \par \par 2. Mixed hyperlipidemia, predominantly high TG\par - Recommend to see nutritionist for diet improvement and reduction of fat consumption will continue with statins\par \par Return in 3 months\par  \par \par \par \par \par \par  [Exercise/Effect on Glucose] : exercise/effect on glucose

## 2021-04-09 NOTE — HISTORY OF PRESENT ILLNESS
[FreeTextEntry1] : 61 y/o M, /86, BMI 29.29, former pt from [facility], with Hx of T2DM (dx in 1996) with DM related complications, proteinuria and retinopathy. \par Other significant PMHx includes HTN, HLD, PE (June 2016; hospitalized and d/c with LVH, reduced L ventricular function), DVT and LVH. Follows with a cardiologist Dr. Brenda Zuluaga ([phone]) at [place] on a regular basis. \par Last ophthalmologist visit: several years ago\par Last cardiologist visit: summer 2020\par \par 12/17/2019 \par Today pt with POCT 130 presents for DM f/u, feeling good.  Pt felt like he had "low BS" this morning and drank orange juice and he had a big breakfast.\par Pt notes that he monitors BS 2-3 times a week and his FBS ranges from 90 to 110, and occasional 160. Pt reports that he has not seen neurologist for hypoglycemic evaluation as recommended in his last visit. He also states that he has not visited RD. Pt has gained 5lbs since his last visit. \par Denies pins and needles sensation in lower extremities. \par \par 7/24/20 Telehealth\par He did not have question prior to restriction to today visit\par T2d, proteinuria, CKD, hyperlipidemia,LVH.\par COVID-19 stay home, he continues focus with his treatment\par Funduscopic exam: not in a while\par Clinically doing well, no hypo's,no osmotic diuresis .\par FBS: < 140's\par \par 1/5/21\par Today pt presents for DM f/u with POCT 245, feeling well with c/o blurry vision, and weight gain. Pt states he will make an appointment to see Ophthalmologist soon. \par Pt notes he has not been checking his BS readings\par Pt states he is on Lantus 20u, Metformin 1g BID, Glimepiride 4mg QD, Onglyza 5mg QD, Carvedilol 25mg BID, Aldactone/Spironolactone 25mg QD, Xarelto 20mg, and Olmesartan 40mg. \par Denies pins and needle sensation in lower extremities, and shoulder/hand pain.\par \par 4/6/2021\par Patient is here for a follow-up .Pt's A1c for the past 2 years has been high; 10.7%, 9.2%, 9%, 9.2%, and in January 9.6% . Pt has dm related complication and he sees his cardiologist regularly. /86 ,fasting blood sugar;146  231, 183,191, 196, 200, 187, 306,184, post prandial 361,219,263, 204, 107, 211. He is in general feeling well but he is a bit frustrated with the glucose fluctuation. To lose weight does not appeal to him but he understands that it is best for his health. \par Denies osmotic diuretic symptoms. \par \par Current Medication: Lantus 20u, Humalog 8u, Metformin 1g BID, Glimepiride 4mg QD, Onglyza 5mg QD (DC), Carvedilol 25mg BID, Aldactone/Spironolactone 25mg QD, Xarelto 20mg, and Olmesartan 40mg , Vitamin D.\par Updated Meds: 4/6/2021: Lantus 25u, Humalog 6u, Trulicity\par \par \par Labs:\par - 4/6/2021: POCT Glucose 11\par - 01/5/2021: POCT A1c 9.3%, TSH 1.45, Triglyceride 373, LDL-C 58, HDL-C 49, NON , \par - 11/26/19: A1c 7.2%, Mean Plasma Glucose 160, s.creat 1.69, Microalb/Creat Ratio 46, LDL-c 97\par - 7/2016: A1c 10.8%, high TG, s.creat 1.13, alb/creat ratio 305.4, TSH 0.63

## 2021-04-09 NOTE — PHYSICAL EXAM
[Alert] : alert [Normal Sclera/Conjunctiva] : normal sclera/conjunctiva [Normal Outer Ear/Nose] : the ears and nose were normal in appearance [No Neck Mass] : no neck mass was observed [Normal Rate] : heart rate was normal [No Edema] : no peripheral edema [Normal Bowel Sounds] : normal bowel sounds [No Stigmata of Cushings Syndrome] : no stigmata of Cushings Syndrome [Normal Gait] : normal gait [Oriented x3] : oriented to person, place, and time [Acanthosis Nigricans] : no acanthosis nigricans [de-identified] : DP pulse +1 [de-identified] : Vibratory senses decreased.

## 2021-04-09 NOTE — ADDENDUM
[FreeTextEntry1] : I, Elizabeth Dougherty, acted solely as a scribe for Dr. Fady Negron on this date. 04/06/2021.

## 2021-04-09 NOTE — END OF VISIT
[FreeTextEntry2] : All medical records entries made by the Scribe were at my Dr. Fady Negron direction and personally dictated by me on 04/06/2021. I have reviewed the chart and agree that the record accurately reflects my personal performance of the history, physical exam, assessment and plan. I have also personally directed, reviewed and agreed with the chart.  [Time Spent: ___ minutes] : I have spent [unfilled] minutes of time on the encounter.

## 2021-06-01 LAB
ALBUMIN SERPL ELPH-MCNC: 4.5 G/DL
ALP BLD-CCNC: 55 U/L
ALT SERPL-CCNC: 10 U/L
ANION GAP SERPL CALC-SCNC: 10 MMOL/L
AST SERPL-CCNC: 12 U/L
BILIRUB SERPL-MCNC: 0.5 MG/DL
BUN SERPL-MCNC: 31 MG/DL
CALCIUM SERPL-MCNC: 10.2 MG/DL
CHLORIDE SERPL-SCNC: 105 MMOL/L
CHOLEST SERPL-MCNC: 210 MG/DL
CO2 SERPL-SCNC: 25 MMOL/L
CREAT SERPL-MCNC: 1.97 MG/DL
CREAT SPEC-SCNC: 170 MG/DL
ESTIMATED AVERAGE GLUCOSE: 252 MG/DL
FOLATE SERPL-MCNC: 11 NG/ML
GLUCOSE BLDC GLUCOMTR-MCNC: 141
GLUCOSE SERPL-MCNC: 123 MG/DL
HBA1C MFR BLD HPLC: 10.4 %
HDLC SERPL-MCNC: 46 MG/DL
LDLC SERPL CALC-MCNC: 95 MG/DL
MICROALBUMIN 24H UR DL<=1MG/L-MCNC: 2.2 MG/DL
MICROALBUMIN/CREAT 24H UR-RTO: 13 MG/G
NONHDLC SERPL-MCNC: 164 MG/DL
POTASSIUM SERPL-SCNC: 4.9 MMOL/L
PROT SERPL-MCNC: 7.6 G/DL
SODIUM SERPL-SCNC: 141 MMOL/L
TRIGL SERPL-MCNC: 346 MG/DL
VIT B12 SERPL-MCNC: 240 PG/ML

## 2021-07-07 ENCOUNTER — APPOINTMENT (OUTPATIENT)
Dept: ENDOCRINOLOGY | Facility: CLINIC | Age: 61
End: 2021-07-07
Payer: COMMERCIAL

## 2021-07-07 VITALS
WEIGHT: 215 LBS | HEART RATE: 57 BPM | SYSTOLIC BLOOD PRESSURE: 139 MMHG | HEIGHT: 71 IN | BODY MASS INDEX: 30.1 KG/M2 | DIASTOLIC BLOOD PRESSURE: 81 MMHG

## 2021-07-07 DIAGNOSIS — E78.00 PURE HYPERCHOLESTEROLEMIA, UNSPECIFIED: ICD-10-CM

## 2021-07-07 LAB — GLUCOSE BLDC GLUCOMTR-MCNC: 72

## 2021-07-07 PROCEDURE — 99214 OFFICE O/P EST MOD 30 MIN: CPT | Mod: 25

## 2021-07-07 PROCEDURE — 82962 GLUCOSE BLOOD TEST: CPT

## 2021-07-07 PROCEDURE — 99072 ADDL SUPL MATRL&STAF TM PHE: CPT

## 2021-07-07 PROCEDURE — 83036 HEMOGLOBIN GLYCOSYLATED A1C: CPT | Mod: QW

## 2021-07-07 RX ORDER — SITAGLIPTIN 100 MG/1
100 TABLET, FILM COATED ORAL
Qty: 90 | Refills: 3 | Status: DISCONTINUED | COMMUNITY
Start: 2019-01-29 | End: 2021-07-07

## 2021-07-07 RX ORDER — EMPAGLIFLOZIN 10 MG/1
10 TABLET, FILM COATED ORAL
Qty: 90 | Refills: 1 | Status: DISCONTINUED | COMMUNITY
Start: 2021-01-05 | End: 2021-07-07

## 2021-07-09 PROBLEM — E78.00 HYPERCHOLESTEREMIA: Status: ACTIVE | Noted: 2017-09-26

## 2021-07-09 NOTE — PHYSICAL EXAM
[Alert] : alert [Normal Sclera/Conjunctiva] : normal sclera/conjunctiva [Normal Outer Ear/Nose] : the ears and nose were normal in appearance [No Neck Mass] : no neck mass was observed [Normal Rate] : heart rate was normal [No Edema] : no peripheral edema [Normal Bowel Sounds] : normal bowel sounds [No Stigmata of Cushings Syndrome] : no stigmata of Cushings Syndrome [Normal Gait] : normal gait [Oriented x3] : oriented to person, place, and time [Vibration Dec.] : diminished vibratory sensation at the level of the toes [Acanthosis Nigricans] : no acanthosis nigricans [de-identified] : DP pulse +1 [de-identified] : calluses

## 2021-07-09 NOTE — END OF VISIT
[FreeTextEntry3] : All medical record entries made by the Scribe were at my, Dr. Fady Negron, direction and personally dictated by me on 07/07/2021. I have reviewed the chart and agree that the record accurately reflects my personal performance of the history, physical exam, assessment and plan. I have also personally directed, reviewed and agreed with the chart.  [Time Spent: ___ minutes] : I have spent [unfilled] minutes of time on the encounter.

## 2021-07-09 NOTE — ASSESSMENT
[FreeTextEntry1] : 61 y/o M pt with:\par \par 1. T2DM (dx in 1996) with DM related complications of peripheral neuropathy, proteinuria.\par Pt sees cardiologist at [place] for left ventricular hypertrophy (awaiting for report). \par He is on combined treatment for DM: Metformin 1 g bid, Glimepiride 2 mg (decreased from 4 mg because of recent hypoglycemia), Lantus 30 u qd and Humalog 8 u for breakfast and lunch. He took insulin this morning with no breakfast. As a result, his BS was 72 and he was experiencing hypoglycemic symptoms. \par Insulin kinetics and dynamics explained to pt. Recommend pt not to use prandial insulin without meal. \par POCT A1c of 8.1%. BP is normal. Recent LDL-c is 95, however, TG is in 300s. \par General principles of DM treatment and goals explained to pt. \par Pt will bring ophthalmologist and cardiologist report. \par \par Return in 4 months\par  \par \par \par \par \par \par  [Carbohydrate Consistent Diet] : carbohydrate consistent diet [Importance of Diet and Exercise] : importance of diet and exercise to improve glycemic control, achieve weight loss and improve cardiovascular health [Action and use of Insulin] : action and use of short and long-acting insulin [Self Monitoring of Blood Glucose] : self monitoring of blood glucose

## 2021-07-09 NOTE — HISTORY OF PRESENT ILLNESS
[FreeTextEntry1] : 61 y/o M, former pt from [facility], with Hx of T2DM (dx in 1996) with DM related complications, proteinuria and retinopathy. \par Other PMHx:  HTN, HLD, PE (6/2016; hospitalized and d/c with LVH, reduced L ventricular function), DVT and LVH. \par Last ophthalmologist visit: early 2021; unremarkable\par Follows with cardiologist Dr. Brenda Zuluaga ([phone]) at [place] regularly. Last cardiologist visit: early 2021\par \par 07/07/2021\par Pt presents today with POCT 72 (no breakfast but took insulin this morning), /81 and BMI 29.99 for DM f/u. He is feeling generally well with no major physical complaints. He has gained 4 lbs in last 3 months. \par Pt brought his BS monitor. Average 90 BS reading of 142, average 14 days reading of 182 and average 7 days reading of 198.\par Pt was rx Humalog 8 u tid but pt only takes it bid (does not take it with lunch). He never started on Trulicity; he states that it is still in his refrigerator. \par \par Current Medication: Lantus 30 u qd, Humalog 8 u at breakfast & dinner, Trulicity, Metformin 1g bid, Glimepiride 4 mg qd, Carvedilol 25mg BID, Aldactone/Spironolactone 25mg QD, Xarelto 20mg, Olmesartan 40 mg, Vitamin D.\par \par Labs:\par - 07/07/21: POCT A1c 8.1%\par - 04/06/21: A1c 10.4%, s.creat 1.97, Cholesterol 210, LDL-c 95, , Microalb/Creat 13\par - 01/05/21: A1c 9.6%, s.creat 1.80, LDL-c 58, , Microalb/Creat 72, TSH 1.45\par - 11/26/19: A1c 7.2%, s.creat 1.69, LDL-c 97, Microalb/Creat 46\par - 7/2016: A1c 10.8%, high TG, s.creat 1.13, alb/creat ratio 305.4, TSH 0.63

## 2021-07-09 NOTE — ADDENDUM
[FreeTextEntry1] : I, Zuleyma Sanchez, acted solely as a scribe for Dr. Fady Negron on this date. 07/07/2021.

## 2021-07-12 ENCOUNTER — TRANSCRIPTION ENCOUNTER (OUTPATIENT)
Age: 61
End: 2021-07-12

## 2021-08-11 LAB — HBA1C MFR BLD HPLC: 8.1

## 2021-08-16 ENCOUNTER — TRANSCRIPTION ENCOUNTER (OUTPATIENT)
Age: 61
End: 2021-08-16

## 2021-10-15 ENCOUNTER — TRANSCRIPTION ENCOUNTER (OUTPATIENT)
Age: 61
End: 2021-10-15

## 2021-12-28 ENCOUNTER — APPOINTMENT (OUTPATIENT)
Dept: ENDOCRINOLOGY | Facility: CLINIC | Age: 61
End: 2021-12-28
Payer: COMMERCIAL

## 2021-12-28 VITALS
WEIGHT: 213 LBS | SYSTOLIC BLOOD PRESSURE: 109 MMHG | BODY MASS INDEX: 29.71 KG/M2 | HEART RATE: 62 BPM | DIASTOLIC BLOOD PRESSURE: 69 MMHG

## 2021-12-28 PROCEDURE — 99214 OFFICE O/P EST MOD 30 MIN: CPT | Mod: 25

## 2021-12-28 PROCEDURE — 82962 GLUCOSE BLOOD TEST: CPT

## 2021-12-29 ENCOUNTER — APPOINTMENT (OUTPATIENT)
Dept: ENDOCRINOLOGY | Facility: CLINIC | Age: 61
End: 2021-12-29
Payer: COMMERCIAL

## 2021-12-29 PROCEDURE — 97802 MEDICAL NUTRITION INDIV IN: CPT

## 2021-12-30 NOTE — REVIEW OF SYSTEMS
[Negative] : Heme/Lymph [Recent Weight Loss (___ Lbs)] : no recent weight loss [Blurred Vision] : no blurred vision [Chest Pain] : no chest pain [Shortness Of Breath] : no shortness of breath [Nocturia] : no nocturia [de-identified] : pins and needles sensation in LE

## 2021-12-30 NOTE — END OF VISIT
[FreeTextEntry3] : All medical record entries made by the Scribe were at my, Dr. Fady Negron, direction and personally dictated by me on 12/28/2021. I have reviewed the chart and agree that the record accurately reflects my personal performance of the history, physical exam, assessment and plan. I have also personally directed, reviewed and agreed with the chart.  [Time Spent: ___ minutes] : I have spent [unfilled] minutes of time on the encounter.

## 2021-12-30 NOTE — REVIEW OF SYSTEMS
[Negative] : Heme/Lymph [Recent Weight Loss (___ Lbs)] : no recent weight loss [Blurred Vision] : no blurred vision [Chest Pain] : no chest pain [Shortness Of Breath] : no shortness of breath [Nocturia] : no nocturia [de-identified] : pins and needles sensation in LE

## 2021-12-30 NOTE — ADDENDUM
[FreeTextEntry1] : I, Pardeep Grimes, acted solely as a scribe for Dr. Fady Negron on this date. 12/28/2021.

## 2021-12-30 NOTE — ASSESSMENT
[Carbohydrate Consistent Diet] : carbohydrate consistent diet [Importance of Diet and Exercise] : importance of diet and exercise to improve glycemic control, achieve weight loss and improve cardiovascular health [Action and use of Insulin] : action and use of short and long-acting insulin [Self Monitoring of Blood Glucose] : self monitoring of blood glucose [FreeTextEntry1] : 62 y/o M pt with:\par \par 1. T2DM (dx in 1996) with DM related complications of peripheral neuropathy, proteinuria, and retinopathy: \par No diagnosis of CAD. Pt has been dx with L ventricular hypertrophy. \par He struggles with weight reduction and modification of lifestyle. Pt does not check BS regularly, and when he does, FBS readings are in the 160s. \par I spoke about pt's DM management with him and his close female friend. Explained diabetes treatment goals and the effect of weight reduction in improving pt's diabetes outcomes and sex drive. \par Pt was given a sample for Trulicity. \par Also explained to pt the need to initiate statins, as previously discussed. Pt remains hesitant and said he will discuss this with his cardiologist. \par Pt is to f/u with podiatrist. Referring pt to see nutritionist. \par Send labs today. \par \par Return in 4 months\par \par \par \par

## 2021-12-30 NOTE — PHYSICAL EXAM
[Alert] : alert [Normal Sclera/Conjunctiva] : normal sclera/conjunctiva [Normal Outer Ear/Nose] : the ears and nose were normal in appearance [No Neck Mass] : no neck mass was observed [Normal Rate] : heart rate was normal [No Edema] : no peripheral edema [Normal Bowel Sounds] : normal bowel sounds [No Stigmata of Cushings Syndrome] : no stigmata of Cushings Syndrome [Normal Gait] : normal gait [Vibration Dec.] : diminished vibratory sensation at the level of the toes [Oriented x3] : oriented to person, place, and time [Right foot was examined, including] : right foot ~C was examined, including visual inspection with sensory and pulse exams [Left foot was examined, including] : left foot ~C was examined, including visual inspection with sensory and pulse exams [1+] : 1+ in the dorsalis pedis [Acanthosis Nigricans] : no acanthosis nigricans [de-identified] : periorbital edema [de-identified] : DP pulse +1 [de-identified] : LE skin thin and dry, long nails

## 2021-12-30 NOTE — PHYSICAL EXAM
[Alert] : alert [Normal Sclera/Conjunctiva] : normal sclera/conjunctiva [Normal Outer Ear/Nose] : the ears and nose were normal in appearance [No Neck Mass] : no neck mass was observed [Normal Rate] : heart rate was normal [No Edema] : no peripheral edema [Normal Bowel Sounds] : normal bowel sounds [No Stigmata of Cushings Syndrome] : no stigmata of Cushings Syndrome [Normal Gait] : normal gait [Vibration Dec.] : diminished vibratory sensation at the level of the toes [Oriented x3] : oriented to person, place, and time [Right foot was examined, including] : right foot ~C was examined, including visual inspection with sensory and pulse exams [Left foot was examined, including] : left foot ~C was examined, including visual inspection with sensory and pulse exams [1+] : 1+ in the dorsalis pedis [Acanthosis Nigricans] : no acanthosis nigricans [de-identified] : periorbital edema [de-identified] : DP pulse +1 [de-identified] : LE skin thin and dry, long nails

## 2022-01-04 ENCOUNTER — APPOINTMENT (OUTPATIENT)
Dept: ENDOCRINOLOGY | Facility: CLINIC | Age: 62
End: 2022-01-04

## 2022-01-04 LAB
ALBUMIN SERPL ELPH-MCNC: 4.3 G/DL
ALP BLD-CCNC: 59 U/L
ALT SERPL-CCNC: 16 U/L
ANION GAP SERPL CALC-SCNC: 12 MMOL/L
AST SERPL-CCNC: 13 U/L
BILIRUB SERPL-MCNC: 0.6 MG/DL
BUN SERPL-MCNC: 29 MG/DL
CALCIUM SERPL-MCNC: 9.7 MG/DL
CHLORIDE SERPL-SCNC: 106 MMOL/L
CHOLEST SERPL-MCNC: 176 MG/DL
CO2 SERPL-SCNC: 22 MMOL/L
CREAT SERPL-MCNC: 1.98 MG/DL
CREAT SPEC-SCNC: 249 MG/DL
ESTIMATED AVERAGE GLUCOSE: 217 MG/DL
FOLATE SERPL-MCNC: 14.5 NG/ML
GLUCOSE BLDC GLUCOMTR-MCNC: 86
GLUCOSE SERPL-MCNC: 101 MG/DL
HBA1C MFR BLD HPLC: 9.2 %
HDLC SERPL-MCNC: 35 MG/DL
LDLC SERPL CALC-MCNC: 99 MG/DL
MICROALBUMIN 24H UR DL<=1MG/L-MCNC: 22.1 MG/DL
MICROALBUMIN/CREAT 24H UR-RTO: 89 MG/G
NONHDLC SERPL-MCNC: 141 MG/DL
POTASSIUM SERPL-SCNC: 5.5 MMOL/L
PROT SERPL-MCNC: 7.6 G/DL
SODIUM SERPL-SCNC: 141 MMOL/L
TRIGL SERPL-MCNC: 209 MG/DL
TSH SERPL-ACNC: 1.35 UIU/ML
VIT B12 SERPL-MCNC: 336 PG/ML

## 2022-02-15 NOTE — HISTORY OF PRESENT ILLNESS
[FreeTextEntry1] : Today am here for my routine endocrine visit\par On feeling well doing well no major complaints.  Funduscopic exam this year, cardiologist in October and one of his medicines was switched just over losartan to Entresto working on my lifestyle and diet not limited past medical continue working my libido and sex life is it is not as I will like to fasting glucose it depends of his meals sometimes he could go to 180 but generally below 150 hypoglycemia 2 or 3 times a week.  Humalog 8 to 10 units for breakfast and dinner and basal insulin 2/25 units once a day glimepiride half tab Glucophage 1 g twice daily patient developed side effects to Jardiance in the past and he was prescribed Trulicity but he is not using it as yet systems okay not nocturia, no weight loss, no blurry vision.  No pins-and-needles sensation lower extremity.  He is on an Entresto

## 2022-04-22 ENCOUNTER — RX RENEWAL (OUTPATIENT)
Age: 62
End: 2022-04-22

## 2022-04-22 ENCOUNTER — TRANSCRIPTION ENCOUNTER (OUTPATIENT)
Age: 62
End: 2022-04-22

## 2022-04-22 RX ORDER — ERGOCALCIFEROL 1.25 MG/1
1.25 MG CAPSULE ORAL
Qty: 12 | Refills: 1 | Status: DISCONTINUED | COMMUNITY
Start: 2019-01-29 | End: 2022-04-22

## 2022-04-22 RX ORDER — INSULIN GLARGINE 100 [IU]/ML
100 INJECTION, SOLUTION SUBCUTANEOUS
Qty: 1 | Refills: 0 | Status: DISCONTINUED | COMMUNITY
Start: 2017-11-13 | End: 2022-04-22

## 2022-04-22 RX ORDER — SAXAGLIPTIN 5 MG/1
5 TABLET, FILM COATED ORAL DAILY
Qty: 90 | Refills: 3 | Status: DISCONTINUED | COMMUNITY
Start: 2020-09-30 | End: 2022-04-22

## 2022-04-22 RX ORDER — ATORVASTATIN CALCIUM 20 MG/1
20 TABLET, FILM COATED ORAL
Qty: 90 | Refills: 3 | Status: DISCONTINUED | COMMUNITY
Start: 2019-07-02 | End: 2022-04-22

## 2022-04-22 RX ORDER — ELECTROLYTES/DEXTROSE
32G X 4 MM SOLUTION, ORAL ORAL
Qty: 100 | Refills: 4 | Status: ACTIVE | COMMUNITY
Start: 2020-02-10 | End: 1900-01-01

## 2022-04-22 RX ORDER — INSULIN GLARGINE 100 [IU]/ML
100 INJECTION, SOLUTION SUBCUTANEOUS DAILY
Qty: 1 | Refills: 0 | Status: DISCONTINUED | COMMUNITY
Start: 2017-05-31 | End: 2022-04-22

## 2022-04-22 RX ORDER — BLOOD SUGAR DIAGNOSTIC
STRIP MISCELLANEOUS
Qty: 300 | Refills: 3 | Status: DISCONTINUED | COMMUNITY
Start: 2019-03-26 | End: 2022-04-22

## 2022-04-28 ENCOUNTER — APPOINTMENT (OUTPATIENT)
Dept: ENDOCRINOLOGY | Facility: CLINIC | Age: 62
End: 2022-04-28
Payer: COMMERCIAL

## 2022-04-28 PROCEDURE — 99214 OFFICE O/P EST MOD 30 MIN: CPT | Mod: 95

## 2022-04-28 NOTE — REASON FOR VISIT
[Home] : at home, [unfilled] , at the time of the visit. [Medical Office: ([facility])___] : at the medical office located in  [Verbal consent obtained from patient] : the patient, [unfilled] [Follow - Up] : a follow-up visit [DM Type 2] : DM Type 2 [Friend] : friend

## 2022-04-29 NOTE — END OF VISIT
[FreeTextEntry3] : All medical record entries made by the Scribe were at my, Dr. Fady Negron, direction and personally dictated by me on 04/28/2022. I have reviewed the chart and agree that the record accurately reflects my personal performance of the history, physical exam, assessment and plan. I have also personally directed, reviewed and agreed with the chart.  [Time Spent: ___ minutes] : I have spent [unfilled] minutes of time on the encounter.

## 2022-04-29 NOTE — ASSESSMENT
[FreeTextEntry1] : 62 y/o M pt with:\par \par 1. T2DM diagnosed in 1996: \par Pt has multiple DM related complications that include proteinuria, CKD 3b, and cardiomyopathy (LDH diagnosed 2016). \par Over the past 14 months, his A1c has been suboptimal ranging from 9.2 to 10.4% (in December, it was 9.3%). \par His kidney function test continues to be stable, with recent s.creat 1.98 and GFR 35. \par Again, we discussed prevention of worsening DM and cardiac events/stroke. He said he will start taking his statins and aspirin regularly. \par As previously discussed, pt will start GLP-1 weekly injection and discontinue Glimepiride. \par Follow up with internist and cardiologist. \par Send labs today. Will contact pt with results.\par \par Return in 2 months. \par \par \par \par  [Carbohydrate Consistent Diet] : carbohydrate consistent diet [Importance of Diet and Exercise] : importance of diet and exercise to improve glycemic control, achieve weight loss and improve cardiovascular health [Exercise/Effect on Glucose] : exercise/effect on glucose

## 2022-04-29 NOTE — HISTORY OF PRESENT ILLNESS
[FreeTextEntry1] : 62 y/o M, former pt from [facility], with Hx of T2DM (dx in 1996) with DM related complications, proteinuria and retinopathy. \par Other PMHx:  HTN, HLD, PE (6/2016; hospitalized and d/c with LVH, reduced L ventricular function), DVT and LVH. \par Last ophthalmologist visit: 2021; unremarkable\par Follows with cardiologist Dr. Brenda Zuluaga ([phone]) at [place] regularly. Last cardiologist visit: early 2021\par \par 07/07/2021\par Pt presents today with POCT 72 (no breakfast but took insulin this morning), /81 and BMI 29.99 for DM f/u. He is feeling generally well with no major physical complaints. He has gained 4 lbs in last 3 months. \par Pt brought his BS monitor. Average 90 BS reading of 142, average 14 days reading of 182 and average 7 days reading of 198.\par Pt was rx Humalog 8 u tid but pt only takes it bid (does not take it with lunch). He never started on Trulicity; he states that it is still in his refrigerator. \par \par 12/28/2021\par Pt presents today accompanied by his friend for routine endocrine visit with POCT 86, /69, and BMI 29.71, for DM f/u. He is feeling well with no major complaints but has been experiencing pins and needles sensation in LE. Pt is working on his lifestyle and diet. He notes his libido and sex life is not as he would like it to be. In 10/2021, pt's cardiologist prescribed Entresto and DCed Losartan.  \par FBS "depends on his meals" and sometimes can rise to 180 but is generally below 150. Pt has episodes of hypoglycemia 2-3 times a week. He is consistent with Humalog 8-10 units for breakfast and dinner, Lantus 25 units qd, Glimepiride half tablet qd, and Glucophage 1 g bid. Pt developed side effects to Jardiance in the past and he was prescribed Trulicity, but he has not yet used it. \par Denies SOB, chest pain, nocturia, weight loss, blurry vision. \par \par 04/28/2022\par Pt did not have any questions prior to the initiation of the telehealth visit. \par He presents today via [facility] for DM f/u due to technical difficulties (Dr. Negron did not have access to his case on [facility]).\par Today patient is feeling well, with no physical complaints and good BS readings. His FBS does not exceed 160 in the morning. He uses a glucose meter at home, but requests another meter for work to measure his post prandial readings. Pt is back to work and is maintaining a more physically active lifestyle. He has not seen ophthalmologist or cardiologist from [place] (last visit in October via televideo). Pt is currently on Lantus 25 u qd, Novolog 8 u ac, and Glimepiride 2 mg qd for his DM. \par Denies osmotic diuresis symptoms. \par \par Current Medication: Lantus 25 u qd, Novolog 8 u ac, Atorvastatin 20 mg (recommended 04/28/22), Trulicity qw (started 04/28/22 instead of Glimepiride), Glucophage 1 g bid, Entresto, Carvedilol 25 mg BID, Aldactone/Spironolactone 25 mg QD, Xarelto 20 mg, Vitamin D, ASA. \par - Held: Glimepiride 2 mg qd (04/28/22)\par \par Labs:\par - 07/07/21: POCT A1c 8.1%\par - 04/06/21: A1c 10.4%, s.creat 1.97, Cholesterol 210, LDL-c 95, , Microalb/Creat 13\par - 01/05/21: A1c 9.6%, s.creat 1.80, LDL-c 58, , Microalb/Creat 72, TSH 1.45\par - 11/26/19: A1c 7.2%, s.creat 1.69, LDL-c 97, Microalb/Creat 46\par - 7/2016: A1c 10.8%, high TG, s.creat 1.13, alb/creat ratio 305.4, TSH 0.63

## 2022-04-29 NOTE — THERAPY
[Today's Date] : [unfilled] [Lantus] : Lantus [Novolog] : Novolog [FreeTextEntry9] : 25 u qd [de-identified] : 8 u ac [FreeTextEntry7] : Atorvastatin 20 mg, Trulicity qw

## 2022-04-29 NOTE — ADDENDUM
[FreeTextEntry1] : I Whitney Tom act soley as a scribe for Dr. Fady Negron on this date. 04/28/2022

## 2022-04-29 NOTE — REVIEW OF SYSTEMS
[Negative] : Heme/Lymph [Recent Weight Loss (___ Lbs)] : no recent weight loss [Polyuria] : no polyuria [Dysuria] : no dysuria [Nocturia] : no nocturia

## 2022-05-09 ENCOUNTER — LABORATORY RESULT (OUTPATIENT)
Age: 62
End: 2022-05-09

## 2022-08-18 RX ORDER — GLIMEPIRIDE 4 MG/1
4 TABLET ORAL DAILY
Qty: 90 | Refills: 3 | Status: DISCONTINUED | COMMUNITY
Start: 2017-11-20 | End: 2022-08-18

## 2022-08-18 RX ORDER — METFORMIN HYDROCHLORIDE 1000 MG/1
1000 TABLET, COATED ORAL
Qty: 60 | Refills: 1 | Status: DISCONTINUED | COMMUNITY
Start: 2018-07-05 | End: 2022-08-18

## 2022-08-18 RX ORDER — BLOOD SUGAR DIAGNOSTIC
STRIP MISCELLANEOUS
Qty: 360 | Refills: 2 | Status: ACTIVE | COMMUNITY
Start: 2019-11-26 | End: 1900-01-01

## 2022-10-03 ENCOUNTER — TRANSCRIPTION ENCOUNTER (OUTPATIENT)
Age: 62
End: 2022-10-03

## 2022-10-04 ENCOUNTER — RX RENEWAL (OUTPATIENT)
Age: 62
End: 2022-10-04

## 2023-01-05 ENCOUNTER — APPOINTMENT (OUTPATIENT)
Dept: ENDOCRINOLOGY | Facility: CLINIC | Age: 63
End: 2023-01-05
Payer: COMMERCIAL

## 2023-01-05 VITALS
DIASTOLIC BLOOD PRESSURE: 66 MMHG | WEIGHT: 218 LBS | BODY MASS INDEX: 30.52 KG/M2 | HEART RATE: 88 BPM | SYSTOLIC BLOOD PRESSURE: 111 MMHG | HEIGHT: 71 IN

## 2023-01-05 LAB
GLUCOSE BLDC GLUCOMTR-MCNC: 218
HBA1C MFR BLD HPLC: 8.3

## 2023-01-05 PROCEDURE — 99214 OFFICE O/P EST MOD 30 MIN: CPT

## 2023-01-05 PROCEDURE — 82962 GLUCOSE BLOOD TEST: CPT

## 2023-01-05 PROCEDURE — 83036 HEMOGLOBIN GLYCOSYLATED A1C: CPT | Mod: QW

## 2023-01-05 RX ORDER — FLASH GLUCOSE SCANNING READER
EACH MISCELLANEOUS
Qty: 1 | Refills: 0 | Status: ACTIVE | COMMUNITY
Start: 2023-01-05 | End: 1900-01-01

## 2023-01-05 RX ORDER — FLASH GLUCOSE SENSOR
KIT MISCELLANEOUS
Qty: 3 | Refills: 3 | Status: ACTIVE | COMMUNITY
Start: 2023-01-05 | End: 1900-01-01

## 2023-01-06 NOTE — END OF VISIT
[Time Spent: ___ minutes] : I have spent [unfilled] minutes of time on the encounter. [FreeTextEntry3] : All medical record entries made by the Scribe were at my, Dr. Fady Negron, direction and personally dictated by me on 01/05/2023. I have reviewed the chart and agree that the record accurately reflects my personal performance of the history, physical exam, assessment and plan. I have also personally directed, reviewed and agreed with the chart.

## 2023-01-06 NOTE — ADDENDUM
[FreeTextEntry1] : I Whitney Tom act soley as a scribe for Dr. Fady Negron on this date. 01/05/2023

## 2023-01-06 NOTE — THERAPY
[Today's Date] : [unfilled] [Basaglar] : Basaglar [Humalog] : Humalog [FreeTextEntry9] : 20 u qd [de-identified] : 8 u bid [FreeTextEntry7] : Glimepiride 4 mg, Glucophage 1 g bid

## 2023-01-06 NOTE — PHYSICAL EXAM
[Alert] : alert [Normal Sclera/Conjunctiva] : normal sclera/conjunctiva [Normal Outer Ear/Nose] : the ears and nose were normal in appearance [No Neck Mass] : no neck mass was observed [Normal Rate] : heart rate was normal [No Edema] : no peripheral edema [Normal Bowel Sounds] : normal bowel sounds [No Stigmata of Cushings Syndrome] : no stigmata of Cushings Syndrome [Normal Gait] : normal gait [1+] : 1+ in the dorsalis pedis [Vibration Dec.] : diminished vibratory sensation at the level of the toes [Oriented x3] : oriented to person, place, and time [Acanthosis Nigricans] : no acanthosis nigricans [de-identified] : periorbital edema [de-identified] : DP pulse +1 [de-identified] : LE skin thin and dry, long nails

## 2023-01-06 NOTE — HISTORY OF PRESENT ILLNESS
[FreeTextEntry1] : 63 y/o M, former pt from [facility], with Hx of T2DM (dx in 1996) with DM related complications, proteinuria, CKD 3b, cardiomyopathy (LDH diagnosed 2016), and retinopathy. \par Other PMHx: HTN, HLD, PE (6/2016; hospitalized and d/c with LVH, reduced L ventricular function), DVT and LVH. \par Lifestyle: Active -walks. \par Last ophthalmologist visit: 2021; unremarkable\par Follows with cardiologist Dr. Brenda Zuluaga ([phone]) at [place] regularly. Last cardiologist visit: early 2021\par \par 01/05/2023\par Pt has POCT A1c 8.3%, POCT 218, /68 and BMI 30.41. He gained 5 lbs in 12 months. \par Today, pt is feeling okay, with c/o tingling sensation in L shoulder and numbness in finger, onset 2 months ago, which has been resolved. Cardiology workup was completed - EKG, ECHO, stress test. Was referred to heart failure specialist and advised to consult with endocrinologist to adjust endocrine medications (Jardiance). Since IV contrast, he no longer endorses numbness or tingling sensation. \par He checks FBS qd, which range 150-220. Endorses hypoglycemias in the afternoon (occasionally in the morning), which he associates with long interval between meals. \par Denies CP and numbness. \par \par [Medications verified as per pt on 01/05/2023]  \par Current Medication: Basaglar 20 u, Glimepiride 4 mg, Humalog 8 u bid, Glucophage 1 g bid, Entresto, Carvedilol 25 mg BID, Aldactone/Spironolactone 25 mg QD, Xarelto 20 mg, Vitamin D, ASA. \par - Held: Glimepiride 2 mg qd (04/28/22), Jardiance (side effects)\par \par Medication modified/added this visit: Freestyle Joana (rx 01/05/2023) \par

## 2023-01-06 NOTE — REVIEW OF SYSTEMS
[As Noted in HPI] : as noted in HPI [Recent Weight Gain (___ Lbs)] : recent weight gain: [unfilled] lbs [Negative] : Heme/Lymph [Chest Pain] : no chest pain [Polyuria] : no polyuria [Dysuria] : no dysuria [Nocturia] : no nocturia [Pain/Numbness of Digits] : no pain/numbness of digits [FreeTextEntry2] : He gained 5 lbs in 12 months.

## 2023-01-06 NOTE — ASSESSMENT
[Carbohydrate Consistent Diet] : carbohydrate consistent diet [Importance of Diet and Exercise] : importance of diet and exercise to improve glycemic control, achieve weight loss and improve cardiovascular health [Self Monitoring of Blood Glucose] : self monitoring of blood glucose [FreeTextEntry1] : \par \par \par \par \par

## 2023-01-11 LAB
ANION GAP SERPL CALC-SCNC: 15 MMOL/L
BUN SERPL-MCNC: 26 MG/DL
CALCIUM SERPL-MCNC: 9.4 MG/DL
CHLORIDE SERPL-SCNC: 99 MMOL/L
CHOLEST SERPL-MCNC: 182 MG/DL
CO2 SERPL-SCNC: 22 MMOL/L
CREAT SERPL-MCNC: 2.5 MG/DL
CREAT SPEC-SCNC: 356 MG/DL
EGFR: 28 ML/MIN/1.73M2
ESTIMATED AVERAGE GLUCOSE: 194 MG/DL
GLUCOSE SERPL-MCNC: 218 MG/DL
HBA1C MFR BLD HPLC: 8.4 %
HDLC SERPL-MCNC: 47 MG/DL
LDLC SERPL CALC-MCNC: 86 MG/DL
MICROALBUMIN 24H UR DL<=1MG/L-MCNC: 36.8 MG/DL
MICROALBUMIN/CREAT 24H UR-RTO: 103 MG/G
NONHDLC SERPL-MCNC: 136 MG/DL
POTASSIUM SERPL-SCNC: 4.8 MMOL/L
SODIUM SERPL-SCNC: 135 MMOL/L
TRIGL SERPL-MCNC: 247 MG/DL

## 2023-01-12 RX ORDER — BLOOD-GLUCOSE,RECEIVER,CONT
EACH MISCELLANEOUS
Qty: 6 | Refills: 3 | Status: ACTIVE | COMMUNITY
Start: 2023-01-11 | End: 1900-01-01

## 2023-01-20 ENCOUNTER — APPOINTMENT (OUTPATIENT)
Dept: ENDOCRINOLOGY | Facility: CLINIC | Age: 63
End: 2023-01-20
Payer: COMMERCIAL

## 2023-01-20 VITALS
DIASTOLIC BLOOD PRESSURE: 66 MMHG | BODY MASS INDEX: 30.27 KG/M2 | SYSTOLIC BLOOD PRESSURE: 114 MMHG | HEART RATE: 66 BPM | WEIGHT: 217 LBS

## 2023-01-20 LAB — GLUCOSE BLDC GLUCOMTR-MCNC: 102

## 2023-01-20 PROCEDURE — 99213 OFFICE O/P EST LOW 20 MIN: CPT | Mod: 25

## 2023-01-20 PROCEDURE — 82962 GLUCOSE BLOOD TEST: CPT

## 2023-01-20 NOTE — PHYSICAL EXAM
[Alert] : alert [Healthy Appearance] : healthy appearance [No Acute Distress] : no acute distress [Oriented x3] : oriented to person, place, and time

## 2023-01-20 NOTE — ASSESSMENT
[FreeTextEntry1] : Instructed on use of Dexcom G-6;\par Started the first sensor at the office.\par F/U with Dr. Negron.

## 2023-01-20 NOTE — HISTORY OF PRESENT ILLNESS
[FreeTextEntry1] : Pt of dr. Migdalia Mcadams of diabetes, managed with [facility].\par Came today for instructions on use of Decxom G-6 - brought everything with him.\par Feels fine.\par POC Bg - 102 mg/dl

## 2023-01-23 ENCOUNTER — APPOINTMENT (OUTPATIENT)
Dept: ENDOCRINOLOGY | Facility: CLINIC | Age: 63
End: 2023-01-23

## 2023-02-06 ENCOUNTER — TRANSCRIPTION ENCOUNTER (OUTPATIENT)
Age: 63
End: 2023-02-06

## 2023-02-09 ENCOUNTER — APPOINTMENT (OUTPATIENT)
Dept: ENDOCRINOLOGY | Facility: CLINIC | Age: 63
End: 2023-02-09
Payer: COMMERCIAL

## 2023-02-09 VITALS
DIASTOLIC BLOOD PRESSURE: 69 MMHG | HEART RATE: 62 BPM | WEIGHT: 223 LBS | SYSTOLIC BLOOD PRESSURE: 141 MMHG | BODY MASS INDEX: 31.1 KG/M2

## 2023-02-09 LAB — GLUCOSE BLDC GLUCOMTR-MCNC: 99

## 2023-02-09 PROCEDURE — 82962 GLUCOSE BLOOD TEST: CPT

## 2023-02-09 PROCEDURE — 99214 OFFICE O/P EST MOD 30 MIN: CPT | Mod: 25

## 2023-02-10 NOTE — ASSESSMENT
[Carbohydrate Consistent Diet] : carbohydrate consistent diet [Importance of Diet and Exercise] : importance of diet and exercise to improve glycemic control, achieve weight loss and improve cardiovascular health [Self Monitoring of Blood Glucose] : self monitoring of blood glucose [Hypoglycemia Management] : hypoglycemia management [Action and use of Insulin] : action and use of short and long-acting insulin [Other____] : Risks and benefits of [unfilled] was discussed with the patient. [Diabetic Medications] : Risks and benefits of diabetic medications were discussed [FreeTextEntry1] : \par \par \par \par \par

## 2023-02-10 NOTE — REVIEW OF SYSTEMS
[As Noted in HPI] : as noted in HPI [Recent Weight Gain (___ Lbs)] : recent weight gain: [unfilled] lbs [Negative] : Heme/Lymph [Chest Pain] : no chest pain [Polyuria] : no polyuria [Dysuria] : no dysuria [Nocturia] : no nocturia [Pain/Numbness of Digits] : no pain/numbness of digits [FreeTextEntry2] : He gained 6 lbs in 2 weeks.

## 2023-02-10 NOTE — PHYSICAL EXAM
[Alert] : alert [Normal Sclera/Conjunctiva] : normal sclera/conjunctiva [Normal Outer Ear/Nose] : the ears and nose were normal in appearance [No Neck Mass] : no neck mass was observed [Normal Rate] : heart rate was normal [No Edema] : no peripheral edema [Normal Bowel Sounds] : normal bowel sounds [No Stigmata of Cushings Syndrome] : no stigmata of Cushings Syndrome [Normal Gait] : normal gait [1+] : 1+ in the dorsalis pedis [Vibration Dec.] : diminished vibratory sensation at the level of the toes [Oriented x3] : oriented to person, place, and time [Acanthosis Nigricans] : no acanthosis nigricans [de-identified] : periorbital edema [de-identified] : DP pulse +1 [de-identified] : LE skin thin and dry, long nails

## 2023-02-10 NOTE — THERAPY
[Today's Date] : [unfilled] [Basaglar] : Basaglar [Humalog] : Humalog [FreeTextEntry9] : 20 u qd [de-identified] : 8 u ac [FreeTextEntry7] : Glimepiride 2 mg, Glucophage 1 g bid

## 2023-02-10 NOTE — END OF VISIT
[Time Spent: ___ minutes] : I have spent [unfilled] minutes of time on the encounter. [FreeTextEntry3] : All medical record entries made by the Scribe were at my, Dr. Fady Negron, direction and personally dictated by me on 02/09/2023. I have reviewed the chart and agree that the record accurately reflects my personal performance of the history, physical exam, assessment and plan. I have also personally directed, reviewed and agreed with the chart.

## 2023-02-10 NOTE — HISTORY OF PRESENT ILLNESS
[Continuous Glucose Monitoring] : Continuous Glucose Monitoring: Yes [Dexcom] : Dexcom [Finger Stick] : Finger Stick: Yes [Hypoglycemia] : Patient is hypoglycemic. [FreeTextEntry1] : 61 y/o M, former pt from [facility], with Hx of T2DM (dx in 1996). \par DM related complications: proteinuria, CKD 3b, cardiomyopathy (LDH diagnosed 2016), retinopathy. \par Uses Dexcom. \par Last ophthalmologist visit: 2021; unremarkable\par Follows with cardiologist Dr. Brenda Zuluaga ([phone]) at [place] regularly. Last cardiologist visit: early 2021\par \par Other PMHx: HTN, HLD, PE (6/2016; hospitalized and d/c with LVH, reduced L ventricular function), DVT and LVH. \par Lifestyle: Active -walks. \par \par 02/09/2023\par Pt has POCT 99, /69 and BMI 31.1. He is surprised he has gained 6 lbs in 2 weeks. Consumes small portions. Pt received Dexcom and feels he has better control over his BS management. Breakfast is 8-10 am. FBS below 160. After walking to work (~ 5 blocks), his BS is ~80s. PPBS 140-150s. Has occasional night BS in the 200s. \par He also finds it helpful that he has been taking bolus insulin with each meal instead of bid. \par  \par [Medications verified as per pt on 02/09/2023] \par Current Medication: Basaglar 20 u qam (6-7am), Humalog 8 u ac, Glimepiride 4 mg qam, Glucophage 1 g bid, Entresto, Carvedilol 25 mg BID, Aldactone/Spironolactone 25 mg QD, Xarelto 20 mg, Vitamin D, ASA.\par  Held Jardiance (side effects- yeast infection)\par \par Medication modified/added this visit: Glimepiride 2 mg qam (decrease from 4 mg on 02/09/2023)

## 2023-02-10 NOTE — ADDENDUM
[FreeTextEntry1] : I Whitney Tom act soley as a scribe for Dr. Fady Negron on this date. 02/09/2023

## 2023-02-17 ENCOUNTER — TRANSCRIPTION ENCOUNTER (OUTPATIENT)
Age: 63
End: 2023-02-17

## 2023-03-15 ENCOUNTER — RX RENEWAL (OUTPATIENT)
Age: 63
End: 2023-03-15

## 2023-03-15 RX ORDER — BLOOD SUGAR DIAGNOSTIC
STRIP MISCELLANEOUS
Qty: 300 | Refills: 0 | Status: ACTIVE | COMMUNITY
Start: 2021-01-05 | End: 1900-01-01

## 2023-04-11 ENCOUNTER — TRANSCRIPTION ENCOUNTER (OUTPATIENT)
Age: 63
End: 2023-04-11

## 2023-05-12 ENCOUNTER — NON-APPOINTMENT (OUTPATIENT)
Age: 63
End: 2023-05-12

## 2023-06-08 ENCOUNTER — APPOINTMENT (OUTPATIENT)
Dept: ENDOCRINOLOGY | Facility: CLINIC | Age: 63
End: 2023-06-08
Payer: COMMERCIAL

## 2023-06-08 ENCOUNTER — RESULT CHARGE (OUTPATIENT)
Age: 63
End: 2023-06-08

## 2023-06-08 VITALS
WEIGHT: 215 LBS | HEART RATE: 78 BPM | BODY MASS INDEX: 29.99 KG/M2 | DIASTOLIC BLOOD PRESSURE: 64 MMHG | SYSTOLIC BLOOD PRESSURE: 111 MMHG

## 2023-06-08 PROCEDURE — 82962 GLUCOSE BLOOD TEST: CPT

## 2023-06-08 PROCEDURE — 99215 OFFICE O/P EST HI 40 MIN: CPT | Mod: 25

## 2023-06-08 PROCEDURE — 83036 HEMOGLOBIN GLYCOSYLATED A1C: CPT | Mod: QW

## 2023-06-08 NOTE — ASSESSMENT
[Carbohydrate Consistent Diet] : carbohydrate consistent diet [Importance of Diet and Exercise] : importance of diet and exercise to improve glycemic control, achieve weight loss and improve cardiovascular health [Hypoglycemia Management] : hypoglycemia management [Action and use of Insulin] : action and use of short and long-acting insulin [Self Monitoring of Blood Glucose] : self monitoring of blood glucose [Other____] : Risks and benefits of [unfilled] was discussed with the patient. [Diabetic Medications] : Risks and benefits of diabetic medications were discussed [Weight Loss] : weight loss

## 2023-06-27 NOTE — PHYSICAL EXAM
[Alert] : alert [Normal Sclera/Conjunctiva] : normal sclera/conjunctiva [Normal Outer Ear/Nose] : the ears and nose were normal in appearance [No Neck Mass] : no neck mass was observed [Normal Rate] : heart rate was normal [No Edema] : no peripheral edema [Normal Bowel Sounds] : normal bowel sounds [No Stigmata of Cushings Syndrome] : no stigmata of Cushings Syndrome [Normal Gait] : normal gait [1+] : 1+ in the dorsalis pedis [Vibration Dec.] : diminished vibratory sensation at the level of the toes [Oriented x3] : oriented to person, place, and time [Acanthosis Nigricans] : no acanthosis nigricans [de-identified] : periorbital edema [de-identified] : DP pulse +1 [de-identified] : LE skin thin and dry, long nails

## 2023-06-27 NOTE — THERAPY
[Today's Date] : [unfilled] [Basaglar] : Basaglar [Humalog] : Humalog [FreeTextEntry9] : 20 u qd [de-identified] : 6-7 u ac M-F, 8 u ac S-Espinoza [FreeTextEntry7] : Metformin 1000 mg bid, Farxiga 10 mg qd

## 2023-06-27 NOTE — END OF VISIT
[FreeTextEntry3] : All medical record entries made by the Scribe were at my, Dr. Fady Negron, direction and personally dictated by me on 06/08/2023. I have reviewed the chart and agree that the record accurately reflects my personal performance of the history, physical exam, assessment and plan. I have also personally directed, reviewed and agreed with the chart.  [Time Spent: ___ minutes] : I have spent [unfilled] minutes of time on the encounter.

## 2023-06-27 NOTE — HISTORY OF PRESENT ILLNESS
[Continuous Glucose Monitoring] : Continuous Glucose Monitoring: Yes [Dexcom] : Dexcom [Finger Stick] : Finger Stick: Yes [Hypoglycemia] : Patient is hypoglycemic. [FreeTextEntry1] : 63 y/o M, former pt from [facility], with Hx of T2DM (dx in 1996). \par DM related complications: proteinuria, CKD Stage 3b, cardiomyopathy (LDH diagnosed 2016), retinopathy. \par Uses Dexcom. \par Last ophthalmologist visit: 2021; unremarkable\par Follows with cardiologist Dr. Brenda Zuluaga ([phone]) at [place] regularly. Last cardiologist visit: early 2021\par \par Other PMHx: HTN, HLD, PE (6/2016; hospitalized and d/c with LVH, reduced L ventricular function), DVT and LVH. \par Lifestyle: Active -walks. \par Pt's direct phone number: [phone] \par \par 02/09/2023\par Pt has POCT 99, /69 and BMI 31.1. He is surprised he has gained 6 lbs in 2 weeks. Consumes small portions. Pt received Dexcom and feels he has better control over his BS management. Breakfast is 8-10 am. FBS below 160. After walking to work (~ 5 blocks), his BS is ~80s. PPBS 140-150s. Has occasional night BS in the 200s. \par He also finds it helpful that he has been taking bolus insulin with each meal instead of bid. \par  \par \par 06/08/2023\par Pt has POCT 69, POCT A1c 7.2%,  /64 and BMI 29.99. He lost 8 lbs in 4 months. \par CC: "I' want to know what is my new  A1c " \par Pt did not have breakfast this morning. Fasting glucose in the 160's.\par No hypoglycemia\par Last cardiologist visit: 2023. As per pt, cardiologist recommended for him to speak to endocrinologist about Farxiga.  He has not seen ophthalmologist or PCP recently.\par Pt states he walks about 12,000 steps per day. \par Pt brought in recent labs: \par - 05/01/23 Agatson Ca score: 0 \par - 05/01/23 Renal US: L upper pole there is a lesion that measures 1.3 cm. (As per pt he has an appt scheduled with nephrologist Dr. Lionel Romano on 6/15/23) \par \par [Medications verified as per pt on 06/08/2023)  \par Current Medication: Metformin 1000 mg bid, Farxiga 5 mg qd, Basaglar 25 u qam (6-7am), Humalog 8 u ac (breakfast and dinner), Entresto, Carvedilol 25 mg BID, Aldactone/Spironolactone 25 mg qd, Xarelto 20 mg, Vitamin D, ASA.\par - Held Jardiance (side effects), Glimepiride (Dced ~05/2023)\par \par Medication modified/added this visit: Farxiga 10 mg qd (increased from 5 mg 06/08/2023), Basaglar 20 u (decreased from 25 u qam 06/08/2023 ), Humalog 6-7 u ac M-F, 8 u ac S-Espinoza (decreased from 8 u ac)\par  \par

## 2023-06-27 NOTE — ADDENDUM
[FreeTextEntry1] : I, Mickie Patiño act soley as a scribe for Dr. Fady Negron on this date. 06/08/2023

## 2023-06-27 NOTE — DATA REVIEWED
[FreeTextEntry1] : Scanned/Unavailable Labs:\par - 05/01/23 Agatson Ca score: 0 \par \par Scanned/Unavailable Imaging:\par - 05/01/23 Renal US: L upper pole there is a lesion that measures 1.3 cm. (As per pt he has an appt scheduled with nephrologist Dr. Lionel Romano on 6/15/23)

## 2023-07-03 LAB
ANION GAP SERPL CALC-SCNC: 13 MMOL/L
BUN SERPL-MCNC: 29 MG/DL
CALCIUM SERPL-MCNC: 9.8 MG/DL
CHLORIDE SERPL-SCNC: 104 MMOL/L
CHOLEST SERPL-MCNC: 187 MG/DL
CO2 SERPL-SCNC: 24 MMOL/L
CREAT SERPL-MCNC: 1.83 MG/DL
CREAT SPEC-SCNC: 88 MG/DL
EGFR: 41 ML/MIN/1.73M2
ESTIMATED AVERAGE GLUCOSE: 174 MG/DL
GLUCOSE BLDC GLUCOMTR-MCNC: 69
GLUCOSE SERPL-MCNC: 65 MG/DL
HBA1C MFR BLD HPLC: 7.2
HBA1C MFR BLD HPLC: 7.7 %
HDLC SERPL-MCNC: 56 MG/DL
LDLC SERPL CALC-MCNC: 86 MG/DL
MICROALBUMIN 24H UR DL<=1MG/L-MCNC: 6.1 MG/DL
MICROALBUMIN/CREAT 24H UR-RTO: 68 MG/G
NONHDLC SERPL-MCNC: 131 MG/DL
POTASSIUM SERPL-SCNC: 4.6 MMOL/L
SODIUM SERPL-SCNC: 141 MMOL/L
TRIGL SERPL-MCNC: 224 MG/DL

## 2023-07-17 ENCOUNTER — APPOINTMENT (OUTPATIENT)
Dept: OPHTHALMOLOGY | Facility: CLINIC | Age: 63
End: 2023-07-17

## 2023-07-24 ENCOUNTER — TRANSCRIPTION ENCOUNTER (OUTPATIENT)
Age: 63
End: 2023-07-24

## 2023-08-01 ENCOUNTER — RX RENEWAL (OUTPATIENT)
Age: 63
End: 2023-08-01

## 2023-08-09 ENCOUNTER — TRANSCRIPTION ENCOUNTER (OUTPATIENT)
Age: 63
End: 2023-08-09

## 2023-08-15 LAB
ANION GAP SERPL CALC-SCNC: 14 MMOL/L
BUN SERPL-MCNC: 24 MG/DL
CALCIUM SERPL-MCNC: 9.8 MG/DL
CHLORIDE SERPL-SCNC: 105 MMOL/L
CHOLEST SERPL-MCNC: 181 MG/DL
CO2 SERPL-SCNC: 24 MMOL/L
CREAT SERPL-MCNC: 1.81 MG/DL
CREAT SPEC-SCNC: 171 MG/DL
EGFR: 41 ML/MIN/1.73M2
ESTIMATED AVERAGE GLUCOSE: 163 MG/DL
GLUCOSE SERPL-MCNC: 73 MG/DL
HBA1C MFR BLD HPLC: 7.3 %
HDLC SERPL-MCNC: 65 MG/DL
LDLC SERPL CALC-MCNC: 87 MG/DL
MICROALBUMIN 24H UR DL<=1MG/L-MCNC: 20.4 MG/DL
MICROALBUMIN/CREAT 24H UR-RTO: 119 MG/G
NONHDLC SERPL-MCNC: 116 MG/DL
POTASSIUM SERPL-SCNC: 4.1 MMOL/L
SODIUM SERPL-SCNC: 143 MMOL/L
TRIGL SERPL-MCNC: 178 MG/DL
TSH SERPL-ACNC: 1.59 UIU/ML

## 2023-08-16 ENCOUNTER — APPOINTMENT (OUTPATIENT)
Dept: ENDOCRINOLOGY | Facility: CLINIC | Age: 63
End: 2023-08-16
Payer: COMMERCIAL

## 2023-08-16 VITALS
SYSTOLIC BLOOD PRESSURE: 119 MMHG | DIASTOLIC BLOOD PRESSURE: 74 MMHG | WEIGHT: 211 LBS | HEIGHT: 71 IN | BODY MASS INDEX: 29.54 KG/M2 | HEART RATE: 78 BPM

## 2023-08-16 DIAGNOSIS — E11.65 TYPE 2 DIABETES MELLITUS WITH HYPERGLYCEMIA: ICD-10-CM

## 2023-08-16 PROCEDURE — 83036 HEMOGLOBIN GLYCOSYLATED A1C: CPT | Mod: QW

## 2023-08-16 PROCEDURE — 99214 OFFICE O/P EST MOD 30 MIN: CPT | Mod: 25

## 2023-08-16 PROCEDURE — 82962 GLUCOSE BLOOD TEST: CPT

## 2023-08-16 NOTE — ASSESSMENT
[Carbohydrate Consistent Diet] : carbohydrate consistent diet [Importance of Diet and Exercise] : importance of diet and exercise to improve glycemic control, achieve weight loss and improve cardiovascular health [Hypoglycemia Management] : hypoglycemia management [Action and use of Insulin] : action and use of short and long-acting insulin [Self Monitoring of Blood Glucose] : self monitoring of blood glucose [Weight Loss] : weight loss [Other____] : Risks and benefits of [unfilled] was discussed with the patient. [Diabetic Medications] : Risks and benefits of diabetic medications were discussed

## 2023-08-18 ENCOUNTER — APPOINTMENT (OUTPATIENT)
Dept: ENDOCRINOLOGY | Facility: CLINIC | Age: 63
End: 2023-08-18
Payer: COMMERCIAL

## 2023-08-18 VITALS
WEIGHT: 216 LBS | DIASTOLIC BLOOD PRESSURE: 77 MMHG | BODY MASS INDEX: 30.13 KG/M2 | HEART RATE: 78 BPM | SYSTOLIC BLOOD PRESSURE: 140 MMHG

## 2023-08-18 PROBLEM — E11.65 POORLY CONTROLLED TYPE 2 DIABETES MELLITUS: Status: ACTIVE | Noted: 2023-01-06

## 2023-08-18 LAB — GLUCOSE BLDC GLUCOMTR-MCNC: 129

## 2023-08-18 PROCEDURE — 95251 CONT GLUC MNTR ANALYSIS I&R: CPT

## 2023-08-18 PROCEDURE — 82962 GLUCOSE BLOOD TEST: CPT

## 2023-08-18 PROCEDURE — 99215 OFFICE O/P EST HI 40 MIN: CPT | Mod: 25

## 2023-08-18 NOTE — THERAPY
[Today's Date] : [unfilled] [Basaglar] : Basaglar [Humalog] : Humalog [FreeTextEntry9] : 20 u qam [de-identified] : 6-7 u ac M-F, 8 u ac S-Espinoza [FreeTextEntry7] : Metformin 1000 mg bid, Farxiga 10 mg qd

## 2023-08-18 NOTE — ADDENDUM
[FreeTextEntry1] : I, Dolores Recinos, act soley as a scribe for Dr. Fady Negron on this date. 08/16/2023

## 2023-08-18 NOTE — END OF VISIT
[FreeTextEntry3] : All medical record entries made by the Scribe were at my, Dr. Fady Negron, direction and personally dictated by me on 08/16/2023. I have reviewed the chart and agree that the record accurately reflects my personal performance of the history, physical exam, assessment and plan. I have also personally directed, reviewed and agreed with the chart.  [Time Spent: ___ minutes] : I have spent [unfilled] minutes of time on the encounter.

## 2023-08-18 NOTE — HISTORY OF PRESENT ILLNESS
[Continuous Glucose Monitoring] : Continuous Glucose Monitoring: Yes [Dexcom] : Dexcom [Finger Stick] : Finger Stick: Yes [Hypoglycemia] : Patient is hypoglycemic. [FreeTextEntry1] : 61 y/o M, former pt from [facility], with Hx of T2DM (dx in 1996).  DM related complications: proteinuria, CKD Stage 3b, cardiomyopathy (LDH diagnosed 2016), retinopathy.  Uses Dexcom.  Last ophthalmologist visit: 2021; unremarkable Follows with cardiologist Dr. Brenda Zuluaga ([phone]) at [place] regularly. Last cardiologist visit: early 2021  Other PMHx: HTN, HLD, PE (6/2016; hospitalized and d/c with LVH, reduced L ventricular function), DVT and LVH.  Lifestyle: Active: walks.  Pt's direct phone number: [phone]   06/08/2023 Pt has POCT 69, POCT A1c 7.2%, /64 and BMI 29.99. He lost 8 lbs in 4 months.  CC: "I want to know what is my new A1c"  Pt did not have breakfast this morning. Fasting glucose in the 160's. No hypoglycemia Last cardiologist visit: 2023. As per pt, cardiologist recommended for him to speak to endocrinologist about Farxiga.  He has not seen ophthalmologist or PCP recently. Pt states he walks about 12,000 steps per day.  Pt brought in recent labs:  - 05/01/23 Agatson Ca score: 0  - 05/01/23 Renal US: L upper pole there is a lesion that measures 1.3 cm. (As per pt he has an appt scheduled with nephrologist Dr. Lionel Romano on 6/15/23)   08/16/2023 Pt has POCT 35/73, /74 and BMI 29.43. He lost 4 lbs in 1 month.  CC: "I am doing okay" Pt states eats his breakfast around 8 am when he arrives at work. As per pt, at this time (around 8 am) his FBS levels are usually in the 60s-70s.  After he eats, his PPBS will range from 120-140. He then states he eats lunch around 1-2 pm and dinner at 8 pm. He reports he will have snacks (tangerine/popcorn/chips) about an hour after having a meal. However, it is important to note that the pt states these times are dependent on his schedule and thus he does not have a definitive time at which he eats his meals.  He reports taking his insulin at dinner time. Pt states due to the time of this apt he had not eaten and that is why his blood sugar is currently low. He states he uses DexCom 6 to check his BS. As per pt, he heard his DexCom beep to alert that his BS was low today, however, he did not have a chance to go get breakfast. He reports he was able to feel his BS dropping this morning.  As per pt, he saw a kidney specialist in June and reported that he was told "everything was normal".  Patient brought a physician's statement for medical review unit.  [Medications verified as per pt on 08/16/2023]  Current Medication: Metformin 1000 mg bid, Farxiga 10 mg qd, Basaglar 20 u qam (6-7:30 am), Humalog 6-7 u ac M-F and 8 u ac S-Espinoza, Entresto, Carvedilol 25 mg BID, Aldactone/Spironolactone 25 mg qd, Xarelto 20 mg, Vitamin D, ASA. - Held Jardiance (side effects), Glimepiride (Dced ~05/2023)

## 2023-08-18 NOTE — PHYSICAL EXAM
[No Acute Distress] : no acute distress [Normal Sclera/Conjunctiva] : normal sclera/conjunctiva [Normal Outer Ear/Nose] : the ears and nose were normal in appearance [No Neck Mass] : no neck mass was observed [Clear to Auscultation] : lungs were clear to auscultation bilaterally [Normal Rate] : heart rate was normal [Regular Rhythm] : with a regular rhythm [No Edema] : no peripheral edema [Pedal Pulses Normal] : the pedal pulses are present [Soft] : abdomen soft [No Stigmata of Cushings Syndrome] : no stigmata of Cushings Syndrome [Normal Gait] : normal gait [Normal Strength/Tone] : muscle strength and tone were normal [No Rash] : no rash [1+] : 1+ in the dorsalis pedis [Oriented x3] : oriented to person, place, and time [Normal Reflexes] : deep tendon reflexes were 2+ and symmetric [de-identified] : DP pulse +1 [Spine Straight] : spine straight [Acanthosis Nigricans] : no acanthosis nigricans [de-identified] : periorbital edema [de-identified] : LE skin thin and dry, long nails

## 2023-08-21 RX ORDER — INSULIN ASPART 100 [IU]/ML
100 INJECTION, SOLUTION INTRAVENOUS; SUBCUTANEOUS
Qty: 4 | Refills: 2 | Status: ACTIVE | COMMUNITY
Start: 2021-01-07 | End: 1900-01-01

## 2023-08-25 ENCOUNTER — TRANSCRIPTION ENCOUNTER (OUTPATIENT)
Age: 63
End: 2023-08-25
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
## 2023-08-28 NOTE — ADDENDUM
[FreeTextEntry1] : 8/24/23, addendum, SG Dexcom reviewed since visit last week, with decreased incidence of hypoglycemia (4 days in past week without hypo), but still occurring on 2 days since last visit.  Clarified events surrouding these hypos with patient. On sunday, endorses pre-breakfast , bolused 6 units and consumed yogurt with fruiit and then exercised, which caused low.  Reviewed impact of exercise on glucose and that if pre-meal before exercise <140 and having foods with fat/protein/fiber and moderate carb, to skip insulin dose given his pattern of glycemic improvement with exercise. If premeal BG >140, can reduce dose by 50%.  If has hyperglycemia with this modification pre-exercise, can add 2 units to meal bolus prior to exercise. On Tuesday, was at [facility] and thought that he would have lunch/vending machine in [facility], but there was not, so hypo given insulin admin.  He emphasizes to me that he will not bolus without eating moving forward. -- will readdress glycemic patterns on monday and contact patient, if further improvement, resolution of hypo, can complete DMV letter  8/28/23, addendum, SG Reviewed Dexcom with resolution of hypoglycemia, now experiencing hyperglycemia in fasting state, worsened intermittently in postprandial state -- increase basaglar back to 20 units, continue prandial dosing -- [facility]V statement completed and reviewed with Dr Negron, faxed to [facility] at fax provided by patient: [phone]

## 2023-08-28 NOTE — ASSESSMENT
[Exercise/Effect on Glucose] : exercise/effect on glucose [Hypoglycemia Management] : hypoglycemia management [Action and use of Insulin] : action and use of short and long-acting insulin [Self Monitoring of Blood Glucose] : self monitoring of blood glucose [FreeTextEntry1] : 1. T2D Dx in 1996 A1C goal <7% A1C - 7.3% (8/15/23) from 7.2%(6/8/23) from 8.4% (1/5/23) Current regimen: Basaglar 20 units daily, humalog 6-7 units M-F TIDAC, 8u + SS on weekends, metformin 1000mg BID, Farxiga 10mg daily Dexcom downloaded and reviewed with patient revealing morning hypos following delaying breakfast after AM prandial insulin dosing by ~2H.  Also occasional low later in day, which has been resolved with decreased prandial insulin dosing with visit earlier this week.  Often has rebound hyperglycemia following lows. Glucometer reading performed in office today is at goal postprandially (129) Counseling provided at length with patient regarding hypoglycemia -- classification of hypoglycemia of BG <70, symptoms of hypoglycemia, risk factors of hypoglycemia, inclusive of medication regimen, long duration between meals/skipping meals, as well as when to check BG, interventions for correction based on rule of 15, and possible complications related to hypoglycemia (including loss of consciousness and death).  Discussed that his continued administration of prandial insulin (Humalog) hours before breakfast, continues to result in daily hypoglycemia, albeit to lesser degree following dose decrease with visit with attending endocrinologist, Dr. Negron 2 days ago. Discussed that I cannot sign his form today to send to the DM[facility], given his continued hypoglycemia, but that with making the change to NOT take Novolog and then delaying breakfast by hours, but to only take it as prescribed of 10 minutes before a meal, this will resolve his daily hypoglycemic events (and also likely continue to improve his time he is above goal, from rebound nature following correction). -- Decrease basaglar to 18 units -- Continue Humalog 6-7 units TIDAC, pending CHO size of meal -- Continue Metformin 1000mg BID -- Continue Farxiga 10mg daily -- I will review Dexcom in 1 week, if hypoglycemia resolved, I will assist Dr Negron in completing the DMV form.  If persisting, will make further modifications to regimen at that time  Follow-up in 2-3 months  Jessie Isaac, MS, FN-BC, [facility] 08/18/2023

## 2023-08-30 ENCOUNTER — TRANSCRIPTION ENCOUNTER (OUTPATIENT)
Age: 63
End: 2023-08-30

## 2023-09-05 ENCOUNTER — TRANSCRIPTION ENCOUNTER (OUTPATIENT)
Age: 63
End: 2023-09-05

## 2023-09-11 ENCOUNTER — TRANSCRIPTION ENCOUNTER (OUTPATIENT)
Age: 63
End: 2023-09-11

## 2023-09-18 ENCOUNTER — APPOINTMENT (OUTPATIENT)
Dept: ENDOCRINOLOGY | Facility: CLINIC | Age: 63
End: 2023-09-18
Payer: COMMERCIAL

## 2023-09-18 PROCEDURE — G0108 DIAB MANAGE TRN  PER INDIV: CPT

## 2023-10-05 ENCOUNTER — TRANSCRIPTION ENCOUNTER (OUTPATIENT)
Age: 63
End: 2023-10-05

## 2023-10-05 RX ORDER — INSULIN GLARGINE 100 [IU]/ML
100 INJECTION, SOLUTION SUBCUTANEOUS
Qty: 2 | Refills: 3 | Status: DISCONTINUED | COMMUNITY
Start: 2019-01-15 | End: 2023-10-05

## 2023-10-06 RX ORDER — INSULIN GLARGINE 100 [IU]/ML
100 INJECTION, SOLUTION SUBCUTANEOUS
Qty: 30 | Refills: 3 | Status: ACTIVE | COMMUNITY
Start: 2019-01-29 | End: 1900-01-01

## 2023-10-09 ENCOUNTER — TRANSCRIPTION ENCOUNTER (OUTPATIENT)
Age: 63
End: 2023-10-09

## 2023-12-23 LAB
GLUCOSE BLDC GLUCOMTR-MCNC: 35
GLUCOSE BLDC GLUCOMTR-MCNC: 73

## 2023-12-27 ENCOUNTER — RX RENEWAL (OUTPATIENT)
Age: 63
End: 2023-12-27

## 2024-01-09 RX ORDER — METFORMIN HYDROCHLORIDE 1000 MG/1
1000 TABLET, COATED ORAL
Qty: 180 | Refills: 3 | Status: ACTIVE | COMMUNITY
Start: 2019-05-27 | End: 1900-01-01

## 2024-01-09 RX ORDER — INSULIN GLARGINE 100 [IU]/ML
100 INJECTION, SOLUTION SUBCUTANEOUS
Qty: 3 | Refills: 1 | Status: DISCONTINUED | COMMUNITY
Start: 2022-09-30 | End: 2024-01-09

## 2024-01-09 RX ORDER — INSULIN GLARGINE 100 [IU]/ML
100 INJECTION, SOLUTION SUBCUTANEOUS
Qty: 2 | Refills: 1 | Status: ACTIVE | COMMUNITY
Start: 2024-01-09 | End: 1900-01-01

## 2024-01-09 RX ORDER — GLIMEPIRIDE 2 MG/1
2 TABLET ORAL DAILY
Qty: 180 | Refills: 2 | Status: DISCONTINUED | COMMUNITY
Start: 2023-04-10 | End: 2024-01-09

## 2024-01-09 RX ORDER — ATORVASTATIN CALCIUM 20 MG/1
20 TABLET, FILM COATED ORAL
Qty: 90 | Refills: 3 | Status: ACTIVE | COMMUNITY
Start: 2019-07-02 | End: 1900-01-01

## 2024-01-09 RX ORDER — DAPAGLIFLOZIN 10 MG/1
10 TABLET, FILM COATED ORAL
Qty: 90 | Refills: 2 | Status: ACTIVE | COMMUNITY
Start: 2023-04-26 | End: 1900-01-01

## 2024-01-12 ENCOUNTER — RESULT CHARGE (OUTPATIENT)
Age: 64
End: 2024-01-12

## 2024-01-12 ENCOUNTER — APPOINTMENT (OUTPATIENT)
Dept: ENDOCRINOLOGY | Facility: CLINIC | Age: 64
End: 2024-01-12
Payer: COMMERCIAL

## 2024-01-12 VITALS
WEIGHT: 210 LBS | DIASTOLIC BLOOD PRESSURE: 66 MMHG | HEART RATE: 54 BPM | BODY MASS INDEX: 29.29 KG/M2 | SYSTOLIC BLOOD PRESSURE: 119 MMHG

## 2024-01-12 DIAGNOSIS — E11.9 TYPE 2 DIABETES MELLITUS W/OUT COMPLICATIONS: ICD-10-CM

## 2024-01-12 LAB — GLUCOSE BLDC GLUCOMTR-MCNC: 92

## 2024-01-12 PROCEDURE — 99214 OFFICE O/P EST MOD 30 MIN: CPT | Mod: 25

## 2024-01-12 PROCEDURE — 95251 CONT GLUC MNTR ANALYSIS I&R: CPT

## 2024-01-12 PROCEDURE — 82962 GLUCOSE BLOOD TEST: CPT

## 2024-01-12 PROCEDURE — G0108 DIAB MANAGE TRN  PER INDIV: CPT

## 2024-01-12 NOTE — ASSESSMENT
[FreeTextEntry1] : 1. T2D Dx in 1996 A1C goal <7% A1C - 7.3% (8/15/23) from 7.2%(6/8/23) from 8.4% (1/5/23) Current regimen: Basaglar 20 units daily, humalog 6-7 units M-F TIDAC, 8u + SS on weekends, metformin 1000mg BID, Farxiga 10mg daily Dexcom downloaded and reviewed with patient revealing overall much less hypoglycemia,  prandial excursions, occasionally overcorrected to at or near hypo with rebound hyperglycemia Glucometer reading performed in office today is at goal postprandially (92).  Eliud endorses dietary excursions more since returning to work, which he attributes to the changes in his dexcom patterns with more prandial glycemic excursions.  Overall rare hypoglycemia noted.  Discussed consideration of add-on GLP1ra, to attempt to replace prandial insulin.  Today, Eliud wishes to see if he can achieve goal TIR on his current regimen over the next 2-3 weeks and then will further consider ozempic initiation (sample provided) Counseling provided re: GLP1 agonist regimen, inclusive of dosing, injection administration, mechanism if action (increases glucose-dependent insulin secretion, decreases inappropriate glucagon secretion, slows gastric emptying, and decreases food intake), benefits (cardiovascular, renal, weight loss promotion, A1C reduction), possible side effects (commonly GI: nausea/vomiting, diarrhea, constipation; headache, fatigue dizziness, injection site reactions and rare: pancreatitis).  Discussed contraindication if personal/fhx of MEN or medullary thyroid cancer and rationale of this contraindication Denies fhx/personal h/o MEN or medullary thyroid cancer. -- continue basaglar to 18 units -- Continue Humalog 6-7 units TIDAC, pending CHO size of meal, but if starting ozempic, plan to only dose before dinner, pending patterns at time of dexcom review to avoid daytime hypos, with goial to d/c all prandial insulin, as able -- Continue Metformin 1000mg BID -- Continue Farxiga 10mg daily -- I will review Dexcom in 3 weeks, as discussed today with patient, I recommend GLP1ra trial irregardless if at goal on MDI regimen.  Follow-up in 3 months  Jessie Isaac MS, FNP-BC, [facility] [place] 01/12/2024

## 2024-01-12 NOTE — HISTORY OF PRESENT ILLNESS
[FreeTextEntry1] : ADRIANO CORTES is a 62 year male with pmhx of T2D (dx 1996), CKD, cardiomyopathy, diabetic retinopathy, HTN, HLD who presents for T2D follow-up. 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 Negron, last visit, 8/16/23 Last visit, 8/18/23, with myself  Interval change: Since last visit, decreased basaglar to 18 units and has continued to NOT take mealtime insulin without eating (h/o hypos induced to taking mealtime insulin unopposed to meals, particularly breakfast). Continues CGM use with Dexcom G6, downloaded and reviewed today with patient Returned home 1/8/2024 Increased snapple and iced tea Met with Latoya WOODALL today and is empowered to make dietary changes   A1C - 7.3% (8/15/23) from 7.2%(6/8/23) from 8.4% (1/5/23) Office Fingerstick -- 92 (not fasting)  Current medication: - Basaglar 20 units daily - novolog 6-7 units M-F TIDAC, 8u + SS on weekends - metformin 1000mg BID - Farxiga 10mg daily  Past medication: - Jardiance,  - glimepiride dc in 5/2023  ADRIANO reports they take their diabetes medication ALL of the time. ADRIANO endorses rare hypoglycemia symptoms or BG <70  Diabetes Self-Management: Uses Dexcom CGM Analysis performed on 01/12/2024: Indication for CGM placement/wear: T2DM Device : Dexcom G6 Sensor placement date:N/A -- personal device Sensor removal date: N/A -- personal device Date of data download/printout: 01/12/2024   Active CGM wear time: 98.3% AVG Glucose: 183 Standard deviation: 28.4  % High: 49% (time above - 12%) % In Range: 50% % Low: 1%- (time below BG 54 - 0%)  CGM Patterns: - prandial excursions, occasionally overcorrected to at or near hypo with rebound hyperglycemia  Clinical recommendations, per A/P section of this office visit note  Diet-- Typically consumes 3 meals/daily, +/- snacks

## 2024-02-01 ENCOUNTER — TRANSCRIPTION ENCOUNTER (OUTPATIENT)
Age: 64
End: 2024-02-01

## 2024-02-10 ENCOUNTER — TRANSCRIPTION ENCOUNTER (OUTPATIENT)
Age: 64
End: 2024-02-10

## 2024-02-12 ENCOUNTER — TRANSCRIPTION ENCOUNTER (OUTPATIENT)
Age: 64
End: 2024-02-12

## 2024-02-12 ENCOUNTER — NON-APPOINTMENT (OUTPATIENT)
Age: 64
End: 2024-02-12

## 2024-02-13 ENCOUNTER — TRANSCRIPTION ENCOUNTER (OUTPATIENT)
Age: 64
End: 2024-02-13

## 2024-02-13 RX ORDER — BLOOD-GLUCOSE TRANSMITTER
EACH MISCELLANEOUS
Qty: 1 | Refills: 3 | Status: ACTIVE | COMMUNITY
Start: 2023-01-11 | End: 1900-01-01

## 2024-02-13 RX ORDER — BLOOD-GLUCOSE SENSOR
EACH MISCELLANEOUS
Qty: 9 | Refills: 3 | Status: ACTIVE | COMMUNITY
Start: 2023-01-11 | End: 1900-01-01

## 2024-04-23 ENCOUNTER — TRANSCRIPTION ENCOUNTER (OUTPATIENT)
Age: 64
End: 2024-04-23

## 2024-04-24 ENCOUNTER — TRANSCRIPTION ENCOUNTER (OUTPATIENT)
Age: 64
End: 2024-04-24

## 2024-05-01 ENCOUNTER — TRANSCRIPTION ENCOUNTER (OUTPATIENT)
Age: 64
End: 2024-05-01

## 2024-06-17 ENCOUNTER — RX RENEWAL (OUTPATIENT)
Age: 64
End: 2024-06-17

## 2024-06-17 RX ORDER — ERGOCALCIFEROL 1.25 MG/1
1.25 MG CAPSULE, LIQUID FILLED ORAL
Qty: 4 | Refills: 1 | Status: ACTIVE | COMMUNITY
Start: 2019-12-16 | End: 1900-01-01

## 2024-06-18 ENCOUNTER — TRANSCRIPTION ENCOUNTER (OUTPATIENT)
Age: 64
End: 2024-06-18

## 2024-06-18 RX ORDER — INSULIN GLARGINE 100 [IU]/ML
100 INJECTION, SOLUTION SUBCUTANEOUS
Qty: 1 | Refills: 1 | Status: ACTIVE | COMMUNITY
Start: 2022-10-03 | End: 1900-01-01

## 2024-11-08 ENCOUNTER — TRANSCRIPTION ENCOUNTER (OUTPATIENT)
Age: 64
End: 2024-11-08

## 2024-11-08 DIAGNOSIS — E11.9 TYPE 2 DIABETES MELLITUS W/OUT COMPLICATIONS: ICD-10-CM

## 2024-11-08 DIAGNOSIS — E11.65 TYPE 2 DIABETES MELLITUS WITH HYPERGLYCEMIA: ICD-10-CM

## 2024-11-25 DIAGNOSIS — E55.9 VITAMIN D DEFICIENCY, UNSPECIFIED: ICD-10-CM

## 2024-11-26 ENCOUNTER — APPOINTMENT (OUTPATIENT)
Dept: ENDOCRINOLOGY | Facility: CLINIC | Age: 64
End: 2024-11-26
Payer: COMMERCIAL

## 2024-11-26 ENCOUNTER — NON-APPOINTMENT (OUTPATIENT)
Age: 64
End: 2024-11-26

## 2024-11-26 VITALS
HEART RATE: 78 BPM | HEIGHT: 71 IN | WEIGHT: 201 LBS | BODY MASS INDEX: 28.14 KG/M2 | SYSTOLIC BLOOD PRESSURE: 80 MMHG | DIASTOLIC BLOOD PRESSURE: 50 MMHG

## 2024-11-26 DIAGNOSIS — E11.9 TYPE 2 DIABETES MELLITUS W/OUT COMPLICATIONS: ICD-10-CM

## 2024-11-26 LAB — GLUCOSE BLDC GLUCOMTR-MCNC: 110

## 2024-11-26 PROCEDURE — 95251 CONT GLUC MNTR ANALYSIS I&R: CPT

## 2024-11-26 PROCEDURE — 99213 OFFICE O/P EST LOW 20 MIN: CPT | Mod: 25

## 2024-12-11 ENCOUNTER — RX RENEWAL (OUTPATIENT)
Age: 64
End: 2024-12-11

## 2024-12-26 ENCOUNTER — RX RENEWAL (OUTPATIENT)
Age: 64
End: 2024-12-26

## 2025-01-21 ENCOUNTER — TRANSCRIPTION ENCOUNTER (OUTPATIENT)
Age: 65
End: 2025-01-21

## 2025-01-27 ENCOUNTER — TRANSCRIPTION ENCOUNTER (OUTPATIENT)
Age: 65
End: 2025-01-27

## 2025-01-31 ENCOUNTER — LABORATORY RESULT (OUTPATIENT)
Age: 65
End: 2025-01-31

## 2025-02-01 ENCOUNTER — NON-APPOINTMENT (OUTPATIENT)
Age: 65
End: 2025-02-01

## 2025-02-03 ENCOUNTER — TRANSCRIPTION ENCOUNTER (OUTPATIENT)
Age: 65
End: 2025-02-03

## 2025-02-03 ENCOUNTER — APPOINTMENT (OUTPATIENT)
Dept: ENDOCRINOLOGY | Facility: CLINIC | Age: 65
End: 2025-02-03
Payer: COMMERCIAL

## 2025-02-03 VITALS
BODY MASS INDEX: 28.17 KG/M2 | HEART RATE: 70 BPM | WEIGHT: 202 LBS | SYSTOLIC BLOOD PRESSURE: 107 MMHG | DIASTOLIC BLOOD PRESSURE: 66 MMHG

## 2025-02-03 DIAGNOSIS — E11.65 TYPE 2 DIABETES MELLITUS WITH HYPERGLYCEMIA: ICD-10-CM

## 2025-02-03 LAB
GLUCOSE BLDC GLUCOMTR-MCNC: 137
HBA1C MFR BLD HPLC: 7.6

## 2025-02-03 PROCEDURE — 99215 OFFICE O/P EST HI 40 MIN: CPT

## 2025-02-03 PROCEDURE — G2211 COMPLEX E/M VISIT ADD ON: CPT | Mod: NC

## 2025-02-03 PROCEDURE — 82962 GLUCOSE BLOOD TEST: CPT

## 2025-02-03 PROCEDURE — 83036 HEMOGLOBIN GLYCOSYLATED A1C: CPT | Mod: QW

## 2025-02-28 ENCOUNTER — TRANSCRIPTION ENCOUNTER (OUTPATIENT)
Age: 65
End: 2025-02-28

## 2025-03-22 ENCOUNTER — RX RENEWAL (OUTPATIENT)
Age: 65
End: 2025-03-22

## 2025-05-01 ENCOUNTER — EMERGENCY (EMERGENCY)
Facility: HOSPITAL | Age: 65
LOS: 1 days | End: 2025-05-01
Attending: EMERGENCY MEDICINE | Admitting: EMERGENCY MEDICINE
Payer: MEDICARE

## 2025-05-01 ENCOUNTER — APPOINTMENT (OUTPATIENT)
Dept: ENDOCRINOLOGY | Facility: CLINIC | Age: 65
End: 2025-05-01
Payer: COMMERCIAL

## 2025-05-01 VITALS
HEART RATE: 62 BPM | TEMPERATURE: 98 F | RESPIRATION RATE: 18 BRPM | HEIGHT: 72 IN | DIASTOLIC BLOOD PRESSURE: 75 MMHG | WEIGHT: 195.11 LBS | SYSTOLIC BLOOD PRESSURE: 131 MMHG | OXYGEN SATURATION: 100 %

## 2025-05-01 VITALS
RESPIRATION RATE: 18 BRPM | DIASTOLIC BLOOD PRESSURE: 65 MMHG | SYSTOLIC BLOOD PRESSURE: 100 MMHG | OXYGEN SATURATION: 100 % | TEMPERATURE: 99 F

## 2025-05-01 VITALS
WEIGHT: 197 LBS | SYSTOLIC BLOOD PRESSURE: 69 MMHG | BODY MASS INDEX: 27.48 KG/M2 | DIASTOLIC BLOOD PRESSURE: 42 MMHG | HEART RATE: 82 BPM

## 2025-05-01 VITALS — DIASTOLIC BLOOD PRESSURE: 40 MMHG | SYSTOLIC BLOOD PRESSURE: 64 MMHG | HEART RATE: 73 BPM

## 2025-05-01 DIAGNOSIS — Z91.012 ALLERGY TO EGGS: ICD-10-CM

## 2025-05-01 DIAGNOSIS — Z79.84 LONG TERM (CURRENT) USE OF ORAL HYPOGLYCEMIC DRUGS: ICD-10-CM

## 2025-05-01 DIAGNOSIS — I11.0 HYPERTENSIVE HEART DISEASE WITH HEART FAILURE: ICD-10-CM

## 2025-05-01 DIAGNOSIS — E11.9 TYPE 2 DIABETES MELLITUS WITHOUT COMPLICATIONS: ICD-10-CM

## 2025-05-01 DIAGNOSIS — R42 DIZZINESS AND GIDDINESS: ICD-10-CM

## 2025-05-01 DIAGNOSIS — I50.9 HEART FAILURE, UNSPECIFIED: ICD-10-CM

## 2025-05-01 DIAGNOSIS — R06.00 DYSPNEA, UNSPECIFIED: ICD-10-CM

## 2025-05-01 DIAGNOSIS — E11.65 TYPE 2 DIABETES MELLITUS WITH HYPERGLYCEMIA: ICD-10-CM

## 2025-05-01 DIAGNOSIS — R00.1 BRADYCARDIA, UNSPECIFIED: ICD-10-CM

## 2025-05-01 DIAGNOSIS — E78.5 HYPERLIPIDEMIA, UNSPECIFIED: ICD-10-CM

## 2025-05-01 DIAGNOSIS — D64.9 ANEMIA, UNSPECIFIED: ICD-10-CM

## 2025-05-01 DIAGNOSIS — I95.9 HYPOTENSION, UNSPECIFIED: ICD-10-CM

## 2025-05-01 DIAGNOSIS — Z86.718 PERSONAL HISTORY OF OTHER VENOUS THROMBOSIS AND EMBOLISM: ICD-10-CM

## 2025-05-01 DIAGNOSIS — Z79.01 LONG TERM (CURRENT) USE OF ANTICOAGULANTS: ICD-10-CM

## 2025-05-01 DIAGNOSIS — Z79.4 LONG TERM (CURRENT) USE OF INSULIN: ICD-10-CM

## 2025-05-01 LAB
ALBUMIN SERPL ELPH-MCNC: 3 G/DL — LOW (ref 3.3–5)
ALP SERPL-CCNC: 76 U/L — SIGNIFICANT CHANGE UP (ref 40–120)
ALT FLD-CCNC: 46 U/L — HIGH (ref 10–45)
ANION GAP SERPL CALC-SCNC: 9 MMOL/L — SIGNIFICANT CHANGE UP (ref 5–17)
AST SERPL-CCNC: SIGNIFICANT CHANGE UP (ref 10–40)
BASOPHILS # BLD AUTO: 0.05 K/UL — SIGNIFICANT CHANGE UP (ref 0–0.2)
BASOPHILS NFR BLD AUTO: 0.8 % — SIGNIFICANT CHANGE UP (ref 0–2)
BILIRUB SERPL-MCNC: 0.8 MG/DL — SIGNIFICANT CHANGE UP (ref 0.2–1.2)
BUN SERPL-MCNC: 37 MG/DL — HIGH (ref 7–23)
CALCIUM SERPL-MCNC: 8.5 MG/DL — SIGNIFICANT CHANGE UP (ref 8.4–10.5)
CHLORIDE SERPL-SCNC: 105 MMOL/L — SIGNIFICANT CHANGE UP (ref 96–108)
CO2 SERPL-SCNC: 24 MMOL/L — SIGNIFICANT CHANGE UP (ref 22–31)
CREAT SERPL-MCNC: 3.04 MG/DL — HIGH (ref 0.5–1.3)
EGFR: 22 ML/MIN/1.73M2 — LOW
EGFR: 22 ML/MIN/1.73M2 — LOW
EOSINOPHIL # BLD AUTO: 0.1 K/UL — SIGNIFICANT CHANGE UP (ref 0–0.5)
EOSINOPHIL NFR BLD AUTO: 1.7 % — SIGNIFICANT CHANGE UP (ref 0–6)
GLUCOSE BLDC GLUCOMTR-MCNC: 259
GLUCOSE SERPL-MCNC: 221 MG/DL — HIGH (ref 70–99)
HBA1C MFR BLD HPLC: 9
HCT VFR BLD CALC: 23.9 % — LOW (ref 39–50)
HGB BLD-MCNC: 7.8 G/DL — LOW (ref 13–17)
IMM GRANULOCYTES NFR BLD AUTO: 0.3 % — SIGNIFICANT CHANGE UP (ref 0–0.9)
LYMPHOCYTES # BLD AUTO: 2.54 K/UL — SIGNIFICANT CHANGE UP (ref 1–3.3)
LYMPHOCYTES # BLD AUTO: 41.9 % — SIGNIFICANT CHANGE UP (ref 13–44)
MCHC RBC-ENTMCNC: 25.4 PG — LOW (ref 27–34)
MCHC RBC-ENTMCNC: 32.6 G/DL — SIGNIFICANT CHANGE UP (ref 32–36)
MCV RBC AUTO: 77.9 FL — LOW (ref 80–100)
MONOCYTES # BLD AUTO: 0.63 K/UL — SIGNIFICANT CHANGE UP (ref 0–0.9)
MONOCYTES NFR BLD AUTO: 10.4 % — SIGNIFICANT CHANGE UP (ref 2–14)
NEUTROPHILS # BLD AUTO: 2.72 K/UL — SIGNIFICANT CHANGE UP (ref 1.8–7.4)
NEUTROPHILS NFR BLD AUTO: 44.9 % — SIGNIFICANT CHANGE UP (ref 43–77)
NRBC BLD AUTO-RTO: 0 /100 WBCS — SIGNIFICANT CHANGE UP (ref 0–0)
PLATELET # BLD AUTO: 124 K/UL — LOW (ref 150–400)
POTASSIUM SERPL-MCNC: 4.7 MMOL/L — SIGNIFICANT CHANGE UP (ref 3.5–5.3)
POTASSIUM SERPL-SCNC: 4.7 MMOL/L — SIGNIFICANT CHANGE UP (ref 3.5–5.3)
PROT SERPL-MCNC: 6.3 G/DL — SIGNIFICANT CHANGE UP (ref 6–8.3)
RBC # BLD: 3.07 M/UL — LOW (ref 4.2–5.8)
RBC # FLD: 14.8 % — HIGH (ref 10.3–14.5)
SODIUM SERPL-SCNC: 138 MMOL/L — SIGNIFICANT CHANGE UP (ref 135–145)
WBC # BLD: 6.06 K/UL — SIGNIFICANT CHANGE UP (ref 3.8–10.5)
WBC # FLD AUTO: 6.06 K/UL — SIGNIFICANT CHANGE UP (ref 3.8–10.5)

## 2025-05-01 PROCEDURE — 99284 EMERGENCY DEPT VISIT MOD MDM: CPT

## 2025-05-01 PROCEDURE — 36415 COLL VENOUS BLD VENIPUNCTURE: CPT

## 2025-05-01 PROCEDURE — 83036 HEMOGLOBIN GLYCOSYLATED A1C: CPT | Mod: QW

## 2025-05-01 PROCEDURE — 93010 ELECTROCARDIOGRAM REPORT: CPT

## 2025-05-01 PROCEDURE — 85025 COMPLETE CBC W/AUTO DIFF WBC: CPT

## 2025-05-01 PROCEDURE — 80053 COMPREHEN METABOLIC PANEL: CPT

## 2025-05-01 PROCEDURE — 99283 EMERGENCY DEPT VISIT LOW MDM: CPT | Mod: 25

## 2025-05-01 PROCEDURE — 82962 GLUCOSE BLOOD TEST: CPT

## 2025-05-01 PROCEDURE — 99214 OFFICE O/P EST MOD 30 MIN: CPT | Mod: 25

## 2025-05-01 PROCEDURE — 93005 ELECTROCARDIOGRAM TRACING: CPT

## 2025-05-01 RX ADMIN — Medication 500 MILLILITER(S): at 12:04

## 2025-05-01 RX ADMIN — Medication 1000 MILLILITER(S): at 14:03

## 2025-06-21 ENCOUNTER — RX RENEWAL (OUTPATIENT)
Age: 65
End: 2025-06-21

## 2025-07-07 ENCOUNTER — TRANSCRIPTION ENCOUNTER (OUTPATIENT)
Age: 65
End: 2025-07-07

## 2025-07-08 ENCOUNTER — TRANSCRIPTION ENCOUNTER (OUTPATIENT)
Age: 65
End: 2025-07-08

## 2025-07-10 ENCOUNTER — TRANSCRIPTION ENCOUNTER (OUTPATIENT)
Age: 65
End: 2025-07-10

## 2025-08-11 ENCOUNTER — TRANSCRIPTION ENCOUNTER (OUTPATIENT)
Age: 65
End: 2025-08-11